# Patient Record
Sex: FEMALE | Race: WHITE | NOT HISPANIC OR LATINO | Employment: STUDENT | URBAN - METROPOLITAN AREA
[De-identification: names, ages, dates, MRNs, and addresses within clinical notes are randomized per-mention and may not be internally consistent; named-entity substitution may affect disease eponyms.]

---
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
## 2019-02-11 ENCOUNTER — TELEPHONE (OUTPATIENT)
Dept: ORTHOPEDICS | Facility: CLINIC | Age: 19
End: 2019-02-11

## 2019-02-11 NOTE — TELEPHONE ENCOUNTER
Scheduled patient with Dr. Asif for scoli check up on 3/12/19 @ 3PM. Patients mother verbalized understanding.

## 2019-03-08 ENCOUNTER — TELEPHONE (OUTPATIENT)
Dept: ORTHOPEDICS | Facility: CLINIC | Age: 19
End: 2019-03-08

## 2019-03-08 NOTE — TELEPHONE ENCOUNTER
Called and spoke with patient mom about rescheduling patient appointment 3/12 because dr villanueva will not be in the office patient mom stated that patient will call the office back to reschedule that appointment when she look at her schedule gave mom office number

## 2019-04-30 ENCOUNTER — HOSPITAL ENCOUNTER (OUTPATIENT)
Dept: RADIOLOGY | Facility: HOSPITAL | Age: 19
Discharge: HOME | End: 2019-04-30
Attending: ORTHOPAEDIC SURGERY
Payer: COMMERCIAL

## 2019-04-30 ENCOUNTER — OFFICE VISIT (OUTPATIENT)
Dept: ORTHOPEDICS | Facility: CLINIC | Age: 19
End: 2019-04-30
Payer: COMMERCIAL

## 2019-04-30 VITALS — BODY MASS INDEX: 19.79 KG/M2 | HEIGHT: 64 IN | WEIGHT: 115.94 LBS

## 2019-04-30 DIAGNOSIS — M41.124 ADOLESCENT IDIOPATHIC SCOLIOSIS OF THORACIC REGION: Primary | ICD-10-CM

## 2019-04-30 DIAGNOSIS — M41.124 ADOLESCENT IDIOPATHIC SCOLIOSIS OF THORACIC REGION: ICD-10-CM

## 2019-04-30 PROCEDURE — 72081 XR SPINE SCOLIOSIS 1 VIEW_SUPINE OR ERECT: ICD-10-PCS | Mod: 26,,, | Performed by: RADIOLOGY

## 2019-04-30 PROCEDURE — 99214 PR OFFICE/OUTPT VISIT, EST, LEVL IV, 30-39 MIN: ICD-10-PCS | Mod: S$GLB,,, | Performed by: ORTHOPAEDIC SURGERY

## 2019-04-30 PROCEDURE — 99999 PR PBB SHADOW E&M-EST. PATIENT-LVL II: ICD-10-PCS | Mod: PBBFAC,,, | Performed by: ORTHOPAEDIC SURGERY

## 2019-04-30 PROCEDURE — 99999 PR PBB SHADOW E&M-EST. PATIENT-LVL II: CPT | Mod: PBBFAC,,, | Performed by: ORTHOPAEDIC SURGERY

## 2019-04-30 PROCEDURE — 72081 X-RAY EXAM ENTIRE SPI 1 VW: CPT | Mod: TC

## 2019-04-30 PROCEDURE — 72081 X-RAY EXAM ENTIRE SPI 1 VW: CPT | Mod: 26,,, | Performed by: RADIOLOGY

## 2019-04-30 PROCEDURE — 99214 OFFICE O/P EST MOD 30 MIN: CPT | Mod: S$GLB,,, | Performed by: ORTHOPAEDIC SURGERY

## 2019-04-30 NOTE — PROGRESS NOTES
Columba is here for a consult for scoliosis.  This was noticed 8 years ago by  .  The curve is mainly thoracic.  It has been worsening. Treatment has included none.  She rates pain a 0 Pain is occassional Menarche was 4 years. Graduated and in college  Family History reviewed and significant for in a sister treated in a brace      (Not in a hospital admission)    Review of Symptoms: Review of Symptoms:Review of Systems   Constitution: Negative for fever and weight loss.   HENT: Negative for congestion.    Eyes: Negative.  Negative for blurred vision.   Cardiovascular: Negative for chest pain.   Respiratory: Negative for cough.    Skin: Negative for rash.   Musculoskeletal: Negative for joint pain.   Gastrointestinal: Negative for abdominal pain.   Genitourinary: Negative for bladder incontinence.   Neurological: Negative for focal weakness.     Active Ambulatory Problems     Diagnosis Date Noted    Ganglion cyst 03/11/2014    Idiopathic scoliosis 03/30/2015    KRISTY (generalized anxiety disorder) 12/11/2018     Resolved Ambulatory Problems     Diagnosis Date Noted    No Resolved Ambulatory Problems     Past Medical History:   Diagnosis Date    KRISTY (generalized anxiety disorder) 12/11/2018    Scoliosis        Physical Exam    Patient alert and oriented  No obvious deformities of face, head or neck.    All extremities pink and warm with good cap refill and no edema.   No skin lesions face back or extremities   Bilateral hips, knees and ankles full and normal ROM  Gait normal.  Motor exam upper and lower extremities intact  Back shows full rom.  Rotation and deformity severe rightthoracic and moderate leftthoracic    Xrays  Xrays were done today  and by my reading,   and show a right mid thoracic curve of 57 degrees T7-L1, a left lumbar curve of 34 degrees and a left upper thoracic curve of 26 Degrees.      Impresion   Scoliosis progressive    Plan  She has definitely progressed despite skeletal maturity.  Discussed surgery at length.  Plan for Posterior spine fusion. Greater then 30 minutes spent with patient, over half that time was spent discussing the above issues.

## 2019-05-03 DIAGNOSIS — M41.124 ADOLESCENT IDIOPATHIC SCOLIOSIS, THORACIC REGION: Primary | ICD-10-CM

## 2019-06-10 RX ORDER — ESCITALOPRAM OXALATE 10 MG/1
TABLET ORAL
Qty: 90 TABLET | Refills: 1 | Status: SHIPPED | OUTPATIENT
Start: 2019-06-10 | End: 2019-12-11 | Stop reason: SDUPTHER

## 2019-07-30 ENCOUNTER — RESEARCH ENCOUNTER (OUTPATIENT)
Dept: RESEARCH | Facility: HOSPITAL | Age: 19
End: 2019-07-30

## 2019-07-30 ENCOUNTER — HOSPITAL ENCOUNTER (OUTPATIENT)
Dept: RADIOLOGY | Facility: HOSPITAL | Age: 19
Discharge: HOME | End: 2019-07-30
Attending: NURSE PRACTITIONER
Payer: COMMERCIAL

## 2019-07-30 ENCOUNTER — OFFICE VISIT (OUTPATIENT)
Dept: ORTHOPEDICS | Facility: CLINIC | Age: 19
End: 2019-07-30
Payer: COMMERCIAL

## 2019-07-30 VITALS
HEART RATE: 84 BPM | HEIGHT: 64 IN | TEMPERATURE: 99 F | SYSTOLIC BLOOD PRESSURE: 111 MMHG | DIASTOLIC BLOOD PRESSURE: 68 MMHG | WEIGHT: 115.94 LBS | BODY MASS INDEX: 19.79 KG/M2

## 2019-07-30 DIAGNOSIS — M41.124 ADOLESCENT IDIOPATHIC SCOLIOSIS, THORACIC REGION: Primary | ICD-10-CM

## 2019-07-30 DIAGNOSIS — M41.124 ADOLESCENT IDIOPATHIC SCOLIOSIS, THORACIC REGION: ICD-10-CM

## 2019-07-30 DIAGNOSIS — L70.9 ACNE, UNSPECIFIED ACNE TYPE: ICD-10-CM

## 2019-07-30 PROCEDURE — 72040 XR SPINE 1 VIEW ANY LEVEL: ICD-10-PCS | Mod: 26,,, | Performed by: RADIOLOGY

## 2019-07-30 PROCEDURE — 99999 PR PBB SHADOW E&M-EST. PATIENT-LVL III: ICD-10-PCS | Mod: PBBFAC,,, | Performed by: NURSE PRACTITIONER

## 2019-07-30 PROCEDURE — 72040 X-RAY EXAM NECK SPINE 2-3 VW: CPT | Mod: 26,,, | Performed by: RADIOLOGY

## 2019-07-30 PROCEDURE — 99499 UNLISTED E&M SERVICE: CPT | Mod: S$GLB,,, | Performed by: NURSE PRACTITIONER

## 2019-07-30 PROCEDURE — 99999 PR PBB SHADOW E&M-EST. PATIENT-LVL III: CPT | Mod: PBBFAC,,, | Performed by: NURSE PRACTITIONER

## 2019-07-30 PROCEDURE — 99499 NO LOS: ICD-10-PCS | Mod: S$GLB,,, | Performed by: NURSE PRACTITIONER

## 2019-07-30 PROCEDURE — 72020 X-RAY EXAM OF SPINE 1 VIEW: CPT | Mod: TC

## 2019-07-30 RX ORDER — DOXYCYCLINE 100 MG/1
100 CAPSULE ORAL 2 TIMES DAILY
Qty: 20 CAPSULE | Refills: 0 | Status: SHIPPED | OUTPATIENT
Start: 2019-07-30 | End: 2019-08-09

## 2019-07-30 NOTE — PROGRESS NOTES
Date: 30 July 2019  Time: 1614  Subject Initials: JUAN   IRB#: [id number]     Study: Scoliosis & Postoperative Hypotension Events     PI: MD Linda Claire NP consented this patient today. The following was discussed:     · Met with participant to discuss possible participation in a research study  · Participant was given a copy of the Informed Consent Form for review  · Participant read the Informed Consent Form in full   · All risks, benefits, alternative therapies, confidentiality, and study requirements were discussed  · Privacy issues and withdrawal options, including HIPAA, were discussed  · Ample opportunity was provided for participant to ask questions and to consider participation  · Participant verbalized that all questions were satisfactorily answered and that they understood the protocol and its requirements  · Participant was provided with contact information for the investigator, physician & research coordinator for future questions or concerns  · Participant denied involvement in any other research study  · Informed consent was signed by participant; she was provided with a signed consent form for her records.      Consent was obtained prior to conducting any study-related procedures.

## 2019-08-09 ENCOUNTER — TELEPHONE (OUTPATIENT)
Dept: ORTHOPEDICS | Facility: CLINIC | Age: 19
End: 2019-08-09

## 2019-08-09 DIAGNOSIS — L70.9 ACNE, UNSPECIFIED ACNE TYPE: Primary | ICD-10-CM

## 2019-08-09 RX ORDER — DOXYCYCLINE 100 MG/1
100 CAPSULE ORAL 2 TIMES DAILY
Qty: 14 CAPSULE | Refills: 0 | Status: SHIPPED | OUTPATIENT
Start: 2019-08-09 | End: 2019-08-16

## 2019-08-09 NOTE — TELEPHONE ENCOUNTER
----- Message from Maria Isabel López LPN sent at 8/9/2019  4:13 PM CDT -----  Regarding: FW:Reminder for Upcoming Procedure  Contact: [phone]      ----- Message -----  From: Columba Allison  Sent: 8/9/2019   1:00 PM  To: Yefri Sharpe  Subject: RE:Reminder for Upcoming Procedure               Hi I misplaced my acne medication, is there a way to send another perscription? Thanks  ----- Message -----  From: Alexander Asif MD  Sent: 8/9/2019  8:30 AM CDT  To: Columba Allison  Subject: Reminder for Upcoming Procedure  [facility] [address] [place] [address]    08/09/2019      Dear Columba,      This is a reminder for your upcoming procedure with Alexander Asif MD on 8/16/2019. We will contact you again before the day of your procedure with your scheduled arrival time unless you have already received this information from your physicians office.         If you have questions or scheduling concerns, you can contact your physicians office:   Alexander Asif MD  Phone Number: [phone]      Sincerely,     [facility] [address] [place] [address]

## 2019-08-12 NOTE — PROGRESS NOTES
sSubjective:      Patient ID: Columba Allison is a 18 y.o. female.    Chief Complaint: Scoliosis    Patient is here today for preop for PSF with Dr. Asif. Hx of scoliosis. No other complaints, denies pain.       Review of patient's allergies indicates:  No Known Allergies    Past Medical History:   Diagnosis Date    KRISTY (generalized anxiety disorder) 12/11/2018    Scoliosis      No past surgical history on file.  Family History   Problem Relation Age of Onset    Miscarriages / Stillbirths Mother         1    Irritable bowel syndrome Mother     No Known Problems Father     Alcohol abuse Neg Hx     Arthritis Neg Hx     Asthma Neg Hx     Birth defects Neg Hx     Cancer Neg Hx     COPD Neg Hx     Depression Neg Hx     Diabetes Neg Hx     Drug abuse Neg Hx     Early death Neg Hx     Hearing loss Neg Hx     Heart disease Neg Hx     Hyperlipidemia Neg Hx     Hypertension Neg Hx     Kidney disease Neg Hx     Learning disabilities Neg Hx     Mental illness Neg Hx     Mental retardation Neg Hx     Stroke Neg Hx     Vision loss Neg Hx     Inflammatory bowel disease Neg Hx        Current Outpatient Medications on File Prior to Visit   Medication Sig Dispense Refill    ALPRAZolam (XANAX) 0.25 MG tablet 1 tab po daily only as needed for severe anxiety 30 tablet 0    escitalopram oxalate (LEXAPRO) 10 MG tablet TAKE 1 TABLET BY MOUTH EVERY DAY 90 tablet 1    MARLISSA 0.15-0.03 mg per tablet        No current facility-administered medications on file prior to visit.        Social History     Social History Narrative    Lives with mom, sister and stepfather; dad involved, lives in town; one dog       Review of Systems   Constitution: Negative for chills, fever and malaise/fatigue.   Cardiovascular: Negative for chest pain and dyspnea on exertion.   Respiratory: Negative for cough and shortness of breath.    Skin: Negative for color change, dry skin, itching, nail changes, rash and suspicious lesions.    Musculoskeletal: Negative for joint pain and joint swelling.   Neurological: Negative for dizziness, numbness, paresthesias and weakness.         Objective:         Afebrile, Vital signs stable   Gen - well-developed, well-nourished, no acute distress  HEENT - Pupils equal/round/reactive to light, normocephalic, atraumatic   Neuro - Normal reflexes, normal sensation, normal motor exam  CV - Regular rate and rhythm, palpable distal pulses   Pulm - Good inspiratory effort with unlaboured breathing  Abd - +Bowel sounds, non-tender, non-distended    General    Development well-developed   Nutrition well-nourished   Body Habitus normal weight   Mood distressed    Speech normal    Tone normal        Spine    Gait Normal    Alignment normal  and scoliosis   Tenderness no tenderness   Sensation normal   Tone tone   Skin Normal skin        Extension normal    Flexion normal    Lateral Bend Right normal  Left normal    Rotation Right normal   Left normal      Functional Tests   Right abnormal straight leg raise test    Left abnormal straight leg raise test     Muscle Strength  Hip Flexors Right 4/5 Left 4/5   Quadriceps Right 4/5 Left 4/5   Hamstrings Right 4/5 Left 4/5   Anterior Tibial Right 4/5 Left 4/5   Gastrocsoleus Right 4/5 Left 4/5   EHL Right 4/5 Left 4/5     Reflexes  Patella reflex Right 2+ Left 2+   Achilles reflex Right 2+ Left 2+     Vascular Exam  Posterior Tibial pulse Right 2+ Left 2+   Dorsalis Pectus pulse Right 2+ Left 2+         Lower              Extremity  Pulse Right 2+  Left 2+  Right 2+  Left 2+             xrays by my read shows left L1-L4 42, T8-T12 right 55, T40T8 left 27, Risser 5, scoliometer shows 15 degrees right thoracic rotation; bending films shows correction to lumbar and mid thoracic spine       Assessment:       1. Adolescent idiopathic scoliosis, thoracic region    2. Acne, unspecified acne type           Plan:       Plan is for thoracic thoracic PSF. Surgical benefits and risks  discussed. Doxycycline as directed for acne. Patient to stop OCP. Consents signed. Pre-op teaching done, labs pending. All questions answered.   Follow up in about 2 weeks (around 8/13/2019).

## 2019-08-14 ENCOUNTER — ANESTHESIA EVENT (OUTPATIENT)
Dept: SURGERY | Facility: HOSPITAL | Age: 19
DRG: 458 | End: 2019-08-14
Payer: COMMERCIAL

## 2019-08-15 ENCOUNTER — HOSPITAL ENCOUNTER (INPATIENT)
Facility: HOSPITAL | Age: 19
LOS: 3 days | Discharge: HOME | DRG: 458 | End: 2019-08-18
Attending: ORTHOPAEDIC SURGERY | Admitting: ORTHOPAEDIC SURGERY
Payer: COMMERCIAL

## 2019-08-15 ENCOUNTER — ANESTHESIA (OUTPATIENT)
Dept: SURGERY | Facility: HOSPITAL | Age: 19
DRG: 458 | End: 2019-08-15
Payer: COMMERCIAL

## 2019-08-15 DIAGNOSIS — M41.124 ADOLESCENT IDIOPATHIC SCOLIOSIS OF THORACIC REGION: Primary | ICD-10-CM

## 2019-08-15 DIAGNOSIS — M41.9 SCOLIOSIS: ICD-10-CM

## 2019-08-15 LAB
ABO + RH BLD: NORMAL
BLD GP AB SCN CELLS X3 SERPL QL: NORMAL
GLUCOSE SERPL-MCNC: 78 MG/DL (ref 70–110)
GLUCOSE SERPL-MCNC: 80 MG/DL (ref 70–110)
GLUCOSE SERPL-MCNC: 93 MG/DL (ref 70–110)
HCO3 UR-SCNC: 20.5 MMOL/L (ref 24–28)
HCO3 UR-SCNC: 22.9 MMOL/L (ref 24–28)
HCO3 UR-SCNC: 25 MMOL/L (ref 24–28)
HCT VFR BLD CALC: 23 %PCV (ref 36–54)
HCT VFR BLD CALC: 26 %PCV (ref 36–54)
HCT VFR BLD CALC: 32 %PCV (ref 36–54)
PCO2 BLDA: 33.3 MMHG (ref 35–45)
PCO2 BLDA: 38.2 MMHG (ref 35–45)
PCO2 BLDA: 57.7 MMHG (ref 35–45)
PH SMN: 7.24 [PH] (ref 7.35–7.45)
PH SMN: 7.39 [PH] (ref 7.35–7.45)
PH SMN: 7.4 [PH] (ref 7.35–7.45)
PO2 BLDA: 253 MMHG (ref 80–100)
PO2 BLDA: 271 MMHG (ref 80–100)
PO2 BLDA: 541 MMHG (ref 80–100)
POC BE: -2 MMOL/L
POC BE: -2 MMOL/L
POC BE: -4 MMOL/L
POC IONIZED CALCIUM: 0.92 MMOL/L (ref 1.06–1.42)
POC IONIZED CALCIUM: 1.02 MMOL/L (ref 1.06–1.42)
POC IONIZED CALCIUM: 1.09 MMOL/L (ref 1.06–1.42)
POC SATURATED O2: 100 % (ref 95–100)
POC TCO2: 21 MMOL/L (ref 23–27)
POC TCO2: 24 MMOL/L (ref 23–27)
POC TCO2: 27 MMOL/L (ref 23–27)
POTASSIUM BLD-SCNC: 3.3 MMOL/L (ref 3.5–5.1)
POTASSIUM BLD-SCNC: 3.4 MMOL/L (ref 3.5–5.1)
POTASSIUM BLD-SCNC: 3.7 MMOL/L (ref 3.5–5.1)
SAMPLE: ABNORMAL
SODIUM BLD-SCNC: 142 MMOL/L (ref 136–145)
SODIUM BLD-SCNC: 148 MMOL/L (ref 136–145)
SODIUM BLD-SCNC: 148 MMOL/L (ref 136–145)

## 2019-08-15 PROCEDURE — 63600175 PHARM REV CODE 636 W HCPCS: Performed by: STUDENT IN AN ORGANIZED HEALTH CARE EDUCATION/TRAINING PROGRAM

## 2019-08-15 PROCEDURE — P9045 ALBUMIN (HUMAN), 5%, 250 ML: HCPCS | Mod: JG | Performed by: STUDENT IN AN ORGANIZED HEALTH CARE EDUCATION/TRAINING PROGRAM

## 2019-08-15 PROCEDURE — 36620 INSERTION CATHETER ARTERY: CPT | Mod: 59,,, | Performed by: ANESTHESIOLOGY

## 2019-08-15 PROCEDURE — 25000003 PHARM REV CODE 250: Performed by: NURSE ANESTHETIST, CERTIFIED REGISTERED

## 2019-08-15 PROCEDURE — 63600175 PHARM REV CODE 636 W HCPCS: Performed by: ANESTHESIOLOGY

## 2019-08-15 PROCEDURE — 25000003 PHARM REV CODE 250: Performed by: ORTHOPAEDIC SURGERY

## 2019-08-15 PROCEDURE — 36000711: Performed by: ORTHOPAEDIC SURGERY

## 2019-08-15 PROCEDURE — S0077 INJECTION, CLINDAMYCIN PHOSP: HCPCS | Performed by: NURSE PRACTITIONER

## 2019-08-15 PROCEDURE — D9220A PRA ANESTHESIA: Mod: ANES,,, | Performed by: ANESTHESIOLOGY

## 2019-08-15 PROCEDURE — 63600175 PHARM REV CODE 636 W HCPCS

## 2019-08-15 PROCEDURE — 63600175 PHARM REV CODE 636 W HCPCS: Performed by: NURSE PRACTITIONER

## 2019-08-15 PROCEDURE — 86920 COMPATIBILITY TEST SPIN: CPT

## 2019-08-15 PROCEDURE — 27100088 HC CELL SAVER

## 2019-08-15 PROCEDURE — 25000003 PHARM REV CODE 250: Performed by: NURSE PRACTITIONER

## 2019-08-15 PROCEDURE — 27201423 OPTIME MED/SURG SUP & DEVICES STERILE SUPPLY: Performed by: ORTHOPAEDIC SURGERY

## 2019-08-15 PROCEDURE — 25000003 PHARM REV CODE 250: Performed by: STUDENT IN AN ORGANIZED HEALTH CARE EDUCATION/TRAINING PROGRAM

## 2019-08-15 PROCEDURE — D9220A PRA ANESTHESIA: ICD-10-PCS | Mod: ANES,,, | Performed by: ANESTHESIOLOGY

## 2019-08-15 PROCEDURE — 36000710: Performed by: ORTHOPAEDIC SURGERY

## 2019-08-15 PROCEDURE — 63600175 PHARM REV CODE 636 W HCPCS: Mod: JG | Performed by: STUDENT IN AN ORGANIZED HEALTH CARE EDUCATION/TRAINING PROGRAM

## 2019-08-15 PROCEDURE — 86850 RBC ANTIBODY SCREEN: CPT

## 2019-08-15 PROCEDURE — 99291 CRITICAL CARE FIRST HOUR: CPT | Mod: ,,, | Performed by: PEDIATRICS

## 2019-08-15 PROCEDURE — 27800903 OPTIME MED/SURG SUP & DEVICES OTHER IMPLANTS: Performed by: ORTHOPAEDIC SURGERY

## 2019-08-15 PROCEDURE — 25000003 PHARM REV CODE 250: Performed by: ANESTHESIOLOGY

## 2019-08-15 PROCEDURE — 99291 PR CRITICAL CARE, E/M 30-74 MINUTES: ICD-10-PCS | Mod: ,,, | Performed by: PEDIATRICS

## 2019-08-15 PROCEDURE — 36620 PR INSERT CATH,ART,PERCUT,SHORTTERM: ICD-10-PCS | Mod: 59,,, | Performed by: ANESTHESIOLOGY

## 2019-08-15 PROCEDURE — 63600175 PHARM REV CODE 636 W HCPCS: Performed by: NURSE ANESTHETIST, CERTIFIED REGISTERED

## 2019-08-15 PROCEDURE — 37000009 HC ANESTHESIA EA ADD 15 MINS: Performed by: ORTHOPAEDIC SURGERY

## 2019-08-15 PROCEDURE — 37000008 HC ANESTHESIA 1ST 15 MINUTES: Performed by: ORTHOPAEDIC SURGERY

## 2019-08-15 PROCEDURE — D9220A PRA ANESTHESIA: ICD-10-PCS | Mod: CRNA,,, | Performed by: NURSE ANESTHETIST, CERTIFIED REGISTERED

## 2019-08-15 PROCEDURE — 20300000 HC PICU ROOM

## 2019-08-15 PROCEDURE — D9220A PRA ANESTHESIA: Mod: CRNA,,, | Performed by: NURSE ANESTHETIST, CERTIFIED REGISTERED

## 2019-08-15 PROCEDURE — 63600175 PHARM REV CODE 636 W HCPCS: Performed by: ORTHOPAEDIC SURGERY

## 2019-08-15 PROCEDURE — C1729 CATH, DRAINAGE: HCPCS | Performed by: ORTHOPAEDIC SURGERY

## 2019-08-15 PROCEDURE — C1713 ANCHOR/SCREW BN/BN,TIS/BN: HCPCS | Performed by: ORTHOPAEDIC SURGERY

## 2019-08-15 DEVICE — SCREW UNIAXIAL 5.0X30MM: Type: IMPLANTABLE DEVICE | Site: BACK | Status: FUNCTIONAL

## 2019-08-15 DEVICE — SCREW UNIAXIAL 5.0X35MM: Type: IMPLANTABLE DEVICE | Site: BACK | Status: FUNCTIONAL

## 2019-08-15 DEVICE — BONE 30CC CANCELLOUS CRUSHED: Type: IMPLANTABLE DEVICE | Site: BACK | Status: FUNCTIONAL

## 2019-08-15 DEVICE — SCREW POLYAXIAL 5.0X35MM: Type: IMPLANTABLE DEVICE | Site: BACK | Status: FUNCTIONAL

## 2019-08-15 DEVICE — SET SCREW LARGE: Type: IMPLANTABLE DEVICE | Site: BACK | Status: FUNCTIONAL

## 2019-08-15 DEVICE — IMPLANTABLE DEVICE: Type: IMPLANTABLE DEVICE | Site: BACK | Status: FUNCTIONAL

## 2019-08-15 RX ORDER — NICARDIPINE HYDROCHLORIDE 0.2 MG/ML
INJECTION INTRAVENOUS
Status: DISCONTINUED | OUTPATIENT
Start: 2019-08-15 | End: 2019-08-15

## 2019-08-15 RX ORDER — MORPHINE SULFATE 2 MG/ML
2 INJECTION, SOLUTION INTRAMUSCULAR; INTRAVENOUS ONCE
Status: DISCONTINUED | OUTPATIENT
Start: 2019-08-15 | End: 2019-08-16

## 2019-08-15 RX ORDER — PROPOFOL 10 MG/ML
VIAL (ML) INTRAVENOUS CONTINUOUS PRN
Status: DISCONTINUED | OUTPATIENT
Start: 2019-08-15 | End: 2019-08-15

## 2019-08-15 RX ORDER — DIAZEPAM 10 MG/2ML
1 INJECTION INTRAMUSCULAR ONCE
Status: DISCONTINUED | OUTPATIENT
Start: 2019-08-15 | End: 2019-08-18 | Stop reason: HOSPADM

## 2019-08-15 RX ORDER — PHENYLEPHRINE HYDROCHLORIDE 10 MG/ML
INJECTION INTRAVENOUS
Status: DISCONTINUED | OUTPATIENT
Start: 2019-08-15 | End: 2019-08-15

## 2019-08-15 RX ORDER — GLYCOPYRROLATE 0.2 MG/ML
INJECTION INTRAMUSCULAR; INTRAVENOUS
Status: DISCONTINUED | OUTPATIENT
Start: 2019-08-15 | End: 2019-08-15

## 2019-08-15 RX ORDER — ALBUMIN HUMAN 50 G/1000ML
SOLUTION INTRAVENOUS CONTINUOUS PRN
Status: DISCONTINUED | OUTPATIENT
Start: 2019-08-15 | End: 2019-08-15

## 2019-08-15 RX ORDER — POTASSIUM CHLORIDE 14.9 MG/ML
INJECTION INTRAVENOUS CONTINUOUS PRN
Status: DISCONTINUED | OUTPATIENT
Start: 2019-08-15 | End: 2019-08-15

## 2019-08-15 RX ORDER — DEXTROSE MONOHYDRATE, SODIUM CHLORIDE, AND POTASSIUM CHLORIDE 50; 1.49; 9 G/1000ML; G/1000ML; G/1000ML
INJECTION, SOLUTION INTRAVENOUS CONTINUOUS
Status: DISCONTINUED | OUTPATIENT
Start: 2019-08-15 | End: 2019-08-16

## 2019-08-15 RX ORDER — DIAZEPAM 5 MG/5ML
3 SOLUTION ORAL EVERY 6 HOURS PRN
Status: DISCONTINUED | OUTPATIENT
Start: 2019-08-15 | End: 2019-08-16

## 2019-08-15 RX ORDER — HYDROMORPHONE HYDROCHLORIDE 1 MG/ML
INJECTION, SOLUTION INTRAMUSCULAR; INTRAVENOUS; SUBCUTANEOUS
Status: DISPENSED
Start: 2019-08-15 | End: 2019-08-16

## 2019-08-15 RX ORDER — DIAZEPAM 5 MG/5ML
1 SOLUTION ORAL ONCE
Status: DISCONTINUED | OUTPATIENT
Start: 2019-08-15 | End: 2019-08-18 | Stop reason: HOSPADM

## 2019-08-15 RX ORDER — DIAZEPAM 10 MG/2ML
INJECTION INTRAMUSCULAR
Status: COMPLETED
Start: 2019-08-15 | End: 2019-08-15

## 2019-08-15 RX ORDER — MIDAZOLAM HYDROCHLORIDE 1 MG/ML
INJECTION, SOLUTION INTRAMUSCULAR; INTRAVENOUS
Status: DISCONTINUED | OUTPATIENT
Start: 2019-08-15 | End: 2019-08-15

## 2019-08-15 RX ORDER — NICARDIPINE HYDROCHLORIDE 0.2 MG/ML
INJECTION INTRAVENOUS CONTINUOUS PRN
Status: DISCONTINUED | OUTPATIENT
Start: 2019-08-15 | End: 2019-08-15

## 2019-08-15 RX ORDER — KETAMINE HCL IN 0.9 % NACL 50 MG/5 ML
SYRINGE (ML) INTRAVENOUS
Status: DISCONTINUED | OUTPATIENT
Start: 2019-08-15 | End: 2019-08-15

## 2019-08-15 RX ORDER — BACITRACIN 50000 [IU]/1
INJECTION, POWDER, FOR SOLUTION INTRAMUSCULAR
Status: DISCONTINUED | OUTPATIENT
Start: 2019-08-15 | End: 2019-08-15 | Stop reason: HOSPADM

## 2019-08-15 RX ORDER — KETOROLAC TROMETHAMINE 10 MG/1
10 TABLET, FILM COATED ORAL EVERY 8 HOURS PRN
Status: DISCONTINUED | OUTPATIENT
Start: 2019-08-15 | End: 2019-08-17

## 2019-08-15 RX ORDER — ADHESIVE BANDAGE
30 BANDAGE TOPICAL 2 TIMES DAILY
Status: DISCONTINUED | OUTPATIENT
Start: 2019-08-15 | End: 2019-08-18 | Stop reason: HOSPADM

## 2019-08-15 RX ORDER — PROPOFOL 10 MG/ML
VIAL (ML) INTRAVENOUS
Status: DISCONTINUED | OUTPATIENT
Start: 2019-08-15 | End: 2019-08-15

## 2019-08-15 RX ORDER — ROCURONIUM BROMIDE 10 MG/ML
INJECTION, SOLUTION INTRAVENOUS
Status: DISCONTINUED | OUTPATIENT
Start: 2019-08-15 | End: 2019-08-15

## 2019-08-15 RX ORDER — METHOCARBAMOL 750 MG/1
750 TABLET, FILM COATED ORAL 4 TIMES DAILY
Status: DISCONTINUED | OUTPATIENT
Start: 2019-08-15 | End: 2019-08-17

## 2019-08-15 RX ORDER — ONDANSETRON 2 MG/ML
INJECTION INTRAMUSCULAR; INTRAVENOUS
Status: DISCONTINUED | OUTPATIENT
Start: 2019-08-15 | End: 2019-08-15

## 2019-08-15 RX ORDER — EPHEDRINE SULFATE 50 MG/ML
INJECTION, SOLUTION INTRAVENOUS
Status: DISCONTINUED | OUTPATIENT
Start: 2019-08-15 | End: 2019-08-15

## 2019-08-15 RX ORDER — HYDROMORPHONE HYDROCHLORIDE 2 MG/ML
INJECTION, SOLUTION INTRAMUSCULAR; INTRAVENOUS; SUBCUTANEOUS
Status: DISCONTINUED | OUTPATIENT
Start: 2019-08-15 | End: 2019-08-15

## 2019-08-15 RX ORDER — MORPHINE SULFATE 1 MG/ML
INJECTION INTRAVENOUS CONTINUOUS
Status: DISCONTINUED | OUTPATIENT
Start: 2019-08-15 | End: 2019-08-16

## 2019-08-15 RX ORDER — TRANEXAMIC ACID 100 MG/ML
INJECTION, SOLUTION INTRAVENOUS CONTINUOUS PRN
Status: DISCONTINUED | OUTPATIENT
Start: 2019-08-15 | End: 2019-08-15

## 2019-08-15 RX ORDER — ESCITALOPRAM OXALATE 5 MG/1
10 TABLET ORAL DAILY
Status: DISCONTINUED | OUTPATIENT
Start: 2019-08-16 | End: 2019-08-16

## 2019-08-15 RX ORDER — FAMOTIDINE 10 MG/ML
20 INJECTION INTRAVENOUS 2 TIMES DAILY
Status: DISCONTINUED | OUTPATIENT
Start: 2019-08-15 | End: 2019-08-17

## 2019-08-15 RX ORDER — ACETAMINOPHEN 650 MG/20.3ML
325 LIQUID ORAL EVERY 6 HOURS
Status: DISCONTINUED | OUTPATIENT
Start: 2019-08-15 | End: 2019-08-15

## 2019-08-15 RX ORDER — ACETAMINOPHEN 160 MG/5ML
325 SOLUTION ORAL EVERY 6 HOURS
Status: DISCONTINUED | OUTPATIENT
Start: 2019-08-15 | End: 2019-08-16

## 2019-08-15 RX ORDER — NALOXONE HCL 0.4 MG/ML
0.02 VIAL (ML) INJECTION
Status: DISCONTINUED | OUTPATIENT
Start: 2019-08-15 | End: 2019-08-18 | Stop reason: HOSPADM

## 2019-08-15 RX ORDER — SODIUM CHLORIDE 9 MG/ML
INJECTION, SOLUTION INTRAVENOUS CONTINUOUS PRN
Status: DISCONTINUED | OUTPATIENT
Start: 2019-08-15 | End: 2019-08-15

## 2019-08-15 RX ORDER — GABAPENTIN 100 MG/1
400 CAPSULE ORAL 3 TIMES DAILY
Status: DISCONTINUED | OUTPATIENT
Start: 2019-08-15 | End: 2019-08-16

## 2019-08-15 RX ORDER — FENTANYL CITRATE 50 UG/ML
INJECTION, SOLUTION INTRAMUSCULAR; INTRAVENOUS
Status: DISCONTINUED | OUTPATIENT
Start: 2019-08-15 | End: 2019-08-15

## 2019-08-15 RX ORDER — CLINDAMYCIN PHOSPHATE 600 MG/50ML
600 INJECTION, SOLUTION INTRAVENOUS
Status: COMPLETED | OUTPATIENT
Start: 2019-08-15 | End: 2019-08-15

## 2019-08-15 RX ORDER — MORPHINE SULFATE 2 MG/ML
INJECTION, SOLUTION INTRAMUSCULAR; INTRAVENOUS
Status: COMPLETED
Start: 2019-08-15 | End: 2019-08-15

## 2019-08-15 RX ORDER — TRANEXAMIC ACID 100 MG/ML
INJECTION, SOLUTION INTRAVENOUS
Status: DISCONTINUED | OUTPATIENT
Start: 2019-08-15 | End: 2019-08-15

## 2019-08-15 RX ORDER — CLINDAMYCIN PHOSPHATE 600 MG/50ML
600 INJECTION, SOLUTION INTRAVENOUS
Status: DISCONTINUED | OUTPATIENT
Start: 2019-08-15 | End: 2019-08-17

## 2019-08-15 RX ORDER — NEOSTIGMINE METHYLSULFATE 1 MG/ML
INJECTION, SOLUTION INTRAVENOUS
Status: DISCONTINUED | OUTPATIENT
Start: 2019-08-15 | End: 2019-08-15

## 2019-08-15 RX ORDER — ACETAMINOPHEN 10 MG/ML
INJECTION, SOLUTION INTRAVENOUS
Status: DISCONTINUED | OUTPATIENT
Start: 2019-08-15 | End: 2019-08-15

## 2019-08-15 RX ADMIN — NICARDIPINE HYDROCHLORIDE 0.5 MCG/KG/MIN: 0.2 INJECTION, SOLUTION INTRAVENOUS at 02:08

## 2019-08-15 RX ADMIN — PROPOFOL 50 MG: 10 INJECTION, EMULSION INTRAVENOUS at 12:08

## 2019-08-15 RX ADMIN — PHENYLEPHRINE HYDROCHLORIDE 10 MCG: 10 INJECTION INTRAVENOUS at 03:08

## 2019-08-15 RX ADMIN — CALCIUM CHLORIDE 200 MG: 100 INJECTION, SOLUTION INTRAVENOUS at 04:08

## 2019-08-15 RX ADMIN — HYDROMORPHONE HYDROCHLORIDE 0.25 MG: 2 INJECTION, SOLUTION INTRAMUSCULAR; INTRAVENOUS; SUBCUTANEOUS at 07:08

## 2019-08-15 RX ADMIN — CLINDAMYCIN IN 5 PERCENT DEXTROSE 600 MG: 12 INJECTION, SOLUTION INTRAVENOUS at 01:08

## 2019-08-15 RX ADMIN — EPHEDRINE SULFATE 10 MG: 50 INJECTION, SOLUTION INTRAMUSCULAR; INTRAVENOUS; SUBCUTANEOUS at 01:08

## 2019-08-15 RX ADMIN — SODIUM CHLORIDE, SODIUM GLUCONATE, SODIUM ACETATE, POTASSIUM CHLORIDE, MAGNESIUM CHLORIDE, SODIUM PHOSPHATE, DIBASIC, AND POTASSIUM PHOSPHATE: .53; .5; .37; .037; .03; .012; .00082 INJECTION, SOLUTION INTRAVENOUS at 03:08

## 2019-08-15 RX ADMIN — REMIFENTANIL HYDROCHLORIDE 0.1 MCG/KG/MIN: 1 INJECTION, POWDER, LYOPHILIZED, FOR SOLUTION INTRAVENOUS at 01:08

## 2019-08-15 RX ADMIN — PHENYLEPHRINE HYDROCHLORIDE 25 MCG: 10 INJECTION INTRAVENOUS at 03:08

## 2019-08-15 RX ADMIN — FENTANYL CITRATE 100 MCG: 50 INJECTION, SOLUTION INTRAMUSCULAR; INTRAVENOUS at 12:08

## 2019-08-15 RX ADMIN — GABAPENTIN 400 MG: 100 CAPSULE ORAL at 09:08

## 2019-08-15 RX ADMIN — PHENYLEPHRINE HYDROCHLORIDE 25 MCG: 10 INJECTION INTRAVENOUS at 05:08

## 2019-08-15 RX ADMIN — PROPOFOL 40 MG: 10 INJECTION, EMULSION INTRAVENOUS at 01:08

## 2019-08-15 RX ADMIN — SODIUM CHLORIDE, SODIUM GLUCONATE, SODIUM ACETATE, POTASSIUM CHLORIDE, MAGNESIUM CHLORIDE, SODIUM PHOSPHATE, DIBASIC, AND POTASSIUM PHOSPHATE: .53; .5; .37; .037; .03; .012; .00082 INJECTION, SOLUTION INTRAVENOUS at 01:08

## 2019-08-15 RX ADMIN — KETAMINE HYDROCHLORIDE 2 MCG/KG/MIN: 50 INJECTION INTRAMUSCULAR; INTRAVENOUS at 01:08

## 2019-08-15 RX ADMIN — DIAZEPAM 1 MG: 5 INJECTION, SOLUTION INTRAMUSCULAR; INTRAVENOUS at 07:08

## 2019-08-15 RX ADMIN — PROPOFOL 100 MCG/KG/MIN: 10 INJECTION, EMULSION INTRAVENOUS at 12:08

## 2019-08-15 RX ADMIN — ROCURONIUM BROMIDE 20 MG: 10 INJECTION, SOLUTION INTRAVENOUS at 01:08

## 2019-08-15 RX ADMIN — PHENYLEPHRINE HYDROCHLORIDE 20 MCG: 10 INJECTION INTRAVENOUS at 05:08

## 2019-08-15 RX ADMIN — PHENYLEPHRINE HYDROCHLORIDE 10 MCG: 10 INJECTION INTRAVENOUS at 04:08

## 2019-08-15 RX ADMIN — ACETAMINOPHEN 500 MG: 10 INJECTION, SOLUTION INTRAVENOUS at 01:08

## 2019-08-15 RX ADMIN — PHENYLEPHRINE HYDROCHLORIDE 20 MCG: 10 INJECTION INTRAVENOUS at 03:08

## 2019-08-15 RX ADMIN — PHENYLEPHRINE HYDROCHLORIDE 20 MCG: 10 INJECTION INTRAVENOUS at 04:08

## 2019-08-15 RX ADMIN — ALBUMIN (HUMAN): 12.5 SOLUTION INTRAVENOUS at 05:08

## 2019-08-15 RX ADMIN — SODIUM CHLORIDE: 0.9 INJECTION, SOLUTION INTRAVENOUS at 12:08

## 2019-08-15 RX ADMIN — FENTANYL CITRATE 25 MCG: 50 INJECTION, SOLUTION INTRAMUSCULAR; INTRAVENOUS at 07:08

## 2019-08-15 RX ADMIN — PHENYLEPHRINE HYDROCHLORIDE 50 MCG: 10 INJECTION INTRAVENOUS at 02:08

## 2019-08-15 RX ADMIN — SODIUM CHLORIDE, SODIUM GLUCONATE, SODIUM ACETATE, POTASSIUM CHLORIDE, MAGNESIUM CHLORIDE, SODIUM PHOSPHATE, DIBASIC, AND POTASSIUM PHOSPHATE: .53; .5; .37; .037; .03; .012; .00082 INJECTION, SOLUTION INTRAVENOUS at 02:08

## 2019-08-15 RX ADMIN — POTASSIUM CHLORIDE: 14.9 INJECTION, SOLUTION INTRAVENOUS at 04:08

## 2019-08-15 RX ADMIN — METHOCARBAMOL TABLETS 750 MG: 750 TABLET, COATED ORAL at 08:08

## 2019-08-15 RX ADMIN — ONDANSETRON 4 MG: 2 INJECTION INTRAMUSCULAR; INTRAVENOUS at 05:08

## 2019-08-15 RX ADMIN — TRANEXAMIC ACID 5 MG/KG/HR: 100 INJECTION, SOLUTION INTRAVENOUS at 01:08

## 2019-08-15 RX ADMIN — ALBUMIN (HUMAN): 12.5 SOLUTION INTRAVENOUS at 03:08

## 2019-08-15 RX ADMIN — NEOSTIGMINE METHYLSULFATE 4 MG: 1 INJECTION INTRAVENOUS at 05:08

## 2019-08-15 RX ADMIN — PHENYLEPHRINE HYDROCHLORIDE 50 MCG: 10 INJECTION INTRAVENOUS at 05:08

## 2019-08-15 RX ADMIN — GABAPENTIN 400 MG: 100 CAPSULE ORAL at 08:08

## 2019-08-15 RX ADMIN — Medication 25 MG: at 01:08

## 2019-08-15 RX ADMIN — CEFAZOLIN SODIUM 2000 MG: 500 POWDER, FOR SOLUTION INTRAMUSCULAR; INTRAVENOUS at 01:08

## 2019-08-15 RX ADMIN — FENTANYL CITRATE 50 MCG: 50 INJECTION, SOLUTION INTRAMUSCULAR; INTRAVENOUS at 02:08

## 2019-08-15 RX ADMIN — FENTANYL CITRATE 50 MCG: 50 INJECTION, SOLUTION INTRAMUSCULAR; INTRAVENOUS at 01:08

## 2019-08-15 RX ADMIN — GLYCOPYRROLATE 0.4 MG: 0.2 INJECTION, SOLUTION INTRAMUSCULAR; INTRAVENOUS at 05:08

## 2019-08-15 RX ADMIN — TRANEXAMIC ACID 156 MG: 100 INJECTION, SOLUTION INTRAVENOUS at 01:08

## 2019-08-15 RX ADMIN — SODIUM CHLORIDE 0.1 MCG/KG/MIN: 9 INJECTION, SOLUTION INTRAVENOUS at 03:08

## 2019-08-15 RX ADMIN — PROPOFOL 50 MG: 10 INJECTION, EMULSION INTRAVENOUS at 02:08

## 2019-08-15 RX ADMIN — MIDAZOLAM HYDROCHLORIDE 2 MG: 1 INJECTION, SOLUTION INTRAMUSCULAR; INTRAVENOUS at 12:08

## 2019-08-15 RX ADMIN — MORPHINE SULFATE 2 MG: 2 INJECTION, SOLUTION INTRAMUSCULAR; INTRAVENOUS at 07:08

## 2019-08-15 RX ADMIN — ACETAMINOPHEN 326.4 MG: 160 SUSPENSION ORAL at 08:08

## 2019-08-15 RX ADMIN — MORPHINE SULFATE: 10 INJECTION INTRAVENOUS at 07:08

## 2019-08-15 RX ADMIN — PROPOFOL 150 MG: 10 INJECTION, EMULSION INTRAVENOUS at 12:08

## 2019-08-15 RX ADMIN — CEFAZOLIN SODIUM 2000 MG: 500 POWDER, FOR SOLUTION INTRAMUSCULAR; INTRAVENOUS at 05:08

## 2019-08-15 RX ADMIN — CALCIUM CHLORIDE 300 MG: 100 INJECTION, SOLUTION INTRAVENOUS at 06:08

## 2019-08-15 RX ADMIN — DEXTROSE, SODIUM CHLORIDE, AND POTASSIUM CHLORIDE: 5; .9; .15 INJECTION INTRAVENOUS at 09:08

## 2019-08-15 NOTE — OP NOTE
[facility]  General Surgery  Operative Note    SUMMARY     Date of Procedure: 8/15/2019     Procedure: Procedure(s) (LRB):  FUSION, SPINE, WITH INSTRUMENTATION-OP (N/A)       Surgeon(s) and Role:     * Alexander Asif MD - Primary    Assisting Surgeon: None    Pre-Operative Diagnosis: Adolescent idiopathic scoliosis, thoracic region [M41.124]    Post-Operative Diagnosis: Post-Op Diagnosis Codes:     * Adolescent idiopathic scoliosis, thoracic region [M41.124]    Anesthesia: General    Technical Procedures Used: Posterior spine Fusion T4-L2    Description of the Findings of the Procedure: scoliosis    Significant Surgical Tasks Conducted by the Assistant(s), if Applicable: none    Complications: No    Estimated Blood Loss (EBL): 900 mL           Implants:   Implant Name Type Inv. Item Serial No.  Lot No. LRB No. Used   BONE 30CC CANCELLOUS CRUSHED - [id number]  BONE 30CC CANCELLOUS CRUSHED [id number] MUSCULOSKELETAL TRANSPLANT FND  N/A 1   BONE 30CC CANCELLOUS CRUSHED - [id number]  BONE 30CC CANCELLOUS CRUSHED [id number] MUSCULOSKELETAL TRANSPLANT FND  N/A 1   SCREW POLYAXIAL 5.0X35MM - [id number]  SCREW POLYAXIAL 5.0X35MM  ORTHOPEDIC SYSTEMS  N/A 1   SCREW UNIAXIAL 5.0X30MM - [id number]  SCREW UNIAXIAL 5.0X30MM  ORTHOPEDIC SYSTEMS  N/A 9   SCREW UNIAXIAL 5.0X35MM - [id number]  SCREW UNIAXIAL 5.0X35MM  ORTHOPEDIC SYSTEMS  N/A 6   CONNECTOR FIXED CROSS 28MM - [id number]  CONNECTOR FIXED CROSS 28MM  ORTHOPEDIC SYSTEMS  N/A 1   SET SCREW LARGE - [id number]  SET SCREW LARGE  ORTHOPEDIC SYSTEMS  N/A 17   5.5MM Cocr JOLIE      N/A 2   5.0MM W-LEMINAR HOOK      N/A 1       Specimens:   Specimen (12h ago, onward)    None                  Condition: Good    Disposition: ICU - extubated and stable.    Attestation: I was present and scrubbed for the entire procedure.    .Instrumentation: OP 5.5 Ti/Cobalt Chrome    This is a patient that comes in for posterior spinal fusion for  scoliosis. The patient and parents understand the risks and indications for the procedure.  Risks include serious neurologic injury, infection, non union, medical complications, need for revision even in the early post operative period.     Once in the OR after general anesthetic, prone positioning, preoperative antibiotics and sterile prep and drape, we began the procedure. TXA was bolused on induction and continuous through the case.  Cell saver was used. Somatosensory Evoked Potentials and Motor Evoked Potentials were established and were normal throughout the case except for about a 15-20 min period when the MAP dropped down to 50 and Motors in upper and lowers were lost.  Full potentials were regained as MAP was corrrected.  . EMGs were done on all Screws and were acceptable.    Flouro was used for starting points and to confirm screw position.     An posterior incision was made over the area to be fused.  A standard posterior approach to the spine was done.  Facetectomies and decortication were done at all levels to be fused T4- L2.  Pedicle screws were placed on the left a t4,5,7,8,9,1-,L1 and 2   On the right they were placed at  T5,7,9,11,L1 and 2.  Do to difficult screw placement, an upgoing laminar hook was placed right T4.   All screws were placed in the same way with establishment of a starting point, checked with flouro, followed by a Lenke type gearshift, probing of the channel to check compitency and then screw placement. Rods were cut and bent appropriately. The left jose was placed first. This was derotated into position.  This was followed by a Direct Vertebral Derotation en bloc derotation.  Next the right jose was placed. One crosslink was placed. All set screws and the crosslink were final tightened with the torque wrench.  Final xrays were attained that showed good correction and no complications.      We debrided the muscle edges.  The wound was soaked for 3 minutes with dilute betadine and  irrigated with 3 liters of pulse lavage with bacitracin. The spinous processes were removed and morselized and combined with other local autograft form facets and 60 of crushed cancellous allograft bone and 1 gram of vancomycin and spread across the fusion area.  Closure was with 1-0 vicryl plus sutures, sub cutaneous v-lock 2.0 suture and a 3.0 subcuticular Stratafix suture.  A drain was placed between the fascial and subcutaneous layer.  Dermabond and Prenio were placed followed by a sterile dressing.  She was awoken and taken to the PICU in stable condition.

## 2019-08-15 NOTE — ANESTHESIA PROCEDURE NOTES
Arterial    Diagnosis: Scoliosis    Patient location during procedure: done in OR  Procedure start time: 8/15/2019 12:51 PM  Timeout: 8/15/2019 12:50 PM  Procedure end time: 8/15/2019 12:52 PM    Staffing  Authorizing Provider: Marjorie Kelly MD  Performing Provider: Kenneth Martin MD    Anesthesiologist was present at the time of the procedure.    Preanesthetic Checklist  Completed: patient identified, site marked, surgical consent, pre-op evaluation, timeout performed, IV checked, risks and benefits discussed, monitors and equipment checked and anesthesia consent givenArterial  Skin Prep: chlorhexidine gluconate  Local Infiltration: none  Orientation: right  Location: radial  Catheter Size: 20 G  Catheter placement by Anatomical landmarks. Heme positive aspiration all ports.Insertion Attempts: 1  Assessment  Dressing: secured with tape and tegaderm

## 2019-08-15 NOTE — INTERVAL H&P NOTE
The patient has been examined and the H&P has been reviewed:    I concur with the findings and no changes have occurred since H&P was written.    Anesthesia/Surgery risks, benefits and alternative options discussed and understood by patient/family.          Active Hospital Problems    Diagnosis  POA    Scoliosis [M41.9]  Yes      Resolved Hospital Problems   No resolved problems to display.

## 2019-08-15 NOTE — ANESTHESIA PREPROCEDURE EVALUATION
[facility]  Anesthesia Pre-Operative Evaluation         Patient Name: Columba Allison  YOB: 2000  MRN: [id number]    SUBJECTIVE:     Pre-operative evaluation for Procedure(s) (LRB):  FUSION, SPINE, WITH INSTRUMENTATION-OP (N/A)     08/15/2019    Columba Allison is a 18 y.o. female w/ a significant PMHx of anxiety, thoracic scoliosis. Otherwise no significant PMHx.    Patient now presents for the above procedure(s).    NPO since 11pm 8/14/19. No previous anesthesia. No family history of anesthesia complications.      Prev airway: None documented.          Patient Active Problem List   Diagnosis    Ganglion cyst    Idiopathic scoliosis    KRISTY (generalized anxiety disorder)    Scoliosis       Review of patient's allergies indicates:  No Known Allergies    Current Inpatient Medications:   ceFAZolin (ANCEF) IV syringe (PEDS)  2,000 mg Intravenous Once Pre-Op    clindamycin (CLEOCIN) IVPB  600 mg Intravenous Once Pre-Op    gabapentin  400 mg Oral TID       No current facility-administered medications on file prior to encounter.      Current Outpatient Medications on File Prior to Encounter   Medication Sig Dispense Refill    ALPRAZolam (XANAX) 0.25 MG tablet 1 tab po daily only as needed for severe anxiety 30 tablet 0    MARLISSA 0.15-0.03 mg per tablet          History reviewed. No pertinent surgical history.    Social History     Socioeconomic History    Marital status: Single     Spouse name: Not on file    Number of children: Not on file    Years of education: Not on file    Highest education level: Not on file   Occupational History    Not on file   Social Needs    Financial resource strain: Not on file    Food insecurity:     Worry: Not on file     Inability: Not on file    Transportation needs:     Medical: Not on file     Non-medical: Not on file   Tobacco Use    Smoking status: Never Smoker    Smokeless tobacco: Never Used   Substance and Sexual Activity     Alcohol use: No    Drug use: No    Sexual activity: Never   Lifestyle    Physical activity:     Days per week: Not on file     Minutes per session: Not on file    Stress: Not on file   Relationships    Social connections:     Talks on phone: Not on file     Gets together: Not on file     Attends Scientologist service: Not on file     Active member of club or organization: Not on file     Attends meetings of clubs or organizations: Not on file     Relationship status: Not on file   Other Topics Concern    Not on file   Social History Narrative    Lives with mom, sister and stepfather; dad involved, lives in town; one dog       OBJECTIVE:     Vital Signs Range (Last 24H):  Temp:  [37.1 °C (98.7 °F)]   Pulse:  [76]   Resp:  [18]   BP: (107)/(64)   SpO2:  [100 %]       Significant Labs:  Lab Results   Component Value Date    WBC 8.27 07/30/2019    HGB 14.2 07/30/2019    HCT 44.8 07/30/2019     (H) 07/30/2019    CHOL 149 12/10/2018    TRIG 69 12/10/2018    HDL 48 12/10/2018    ALT 17 07/30/2019    AST 19 07/30/2019     07/30/2019    K 4.7 07/30/2019     07/30/2019    CREATININE 0.8 07/30/2019    BUN 8 07/30/2019    CO2 27 07/30/2019    TSH 2.531 12/10/2018    INR 1.1 07/30/2019    HGBA1C 4.9 12/10/2018       Diagnostic Studies: No relevant studies.    EKG:   No results found for this or any previous visit.    2D ECHO:  TTE:  No results found for this or any previous visit.    SHAZIA:  No results found for this or any previous visit.    ASSESSMENT/PLAN:         Anesthesia Evaluation    I have reviewed the Patient Summary Reports.     I have reviewed the Medications.     Review of Systems  Anesthesia Hx:  No previous Anesthesia  Neg history of prior surgery. Denies Family Hx of Anesthesia complications.    EENT/Dental:EENT/Dental Normal   Cardiovascular:  Cardiovascular Normal Exercise tolerance: good     Pulmonary:  Pulmonary Normal    Hepatic/GI:  Hepatic/GI Normal    Musculoskeletal:   Thoracic  scoliosis   Neurological:  Neurology Normal    Endocrine:  Endocrine Normal    Psych:   anxiety          Physical Exam  General:  Well nourished    Airway/Jaw/Neck:  Airway Findings: Mouth Opening: Normal Tongue: Normal  General Airway Assessment: Adult  Mallampati: I  TM Distance: Normal, at least 6 cm  Jaw/Neck Findings:  Neck ROM: Normal ROM      Dental:  Dental Findings: In tact   Chest/Lungs:  Chest/Lungs Findings: Clear to auscultation, Normal Respiratory Rate     Heart/Vascular:  Heart Findings: Rate: Normal  Rhythm: Regular Rhythm     Abdomen:  Abdomen Findings: Normal      Mental Status:  Mental Status Findings:  Cooperative, Alert and Oriented         Anesthesia Plan  Type of Anesthesia, risks & benefits discussed:  Anesthesia Type:  general  Patient's Preference:   Intra-op Monitoring Plan: standard ASA monitors and arterial line  Intra-op Monitoring Plan Comments:   Post Op Pain Control Plan: multimodal analgesia, IV/PO Opioids PRN and per primary service following discharge from PACU  Post Op Pain Control Plan Comments:   Induction:   IV  Beta Blocker:  Patient is not currently on a Beta-Blocker (No further documentation required).       Informed Consent: Patient understands risks and agrees with Anesthesia plan.  Questions answered. Anesthesia consent signed with patient.  ASA Score: 2     Day of Surgery Review of History & Physical:            Ready For Surgery From Anesthesia Perspective.

## 2019-08-15 NOTE — BRIEF OP NOTE
[facility]  Brief Operative Note    SUMMARY     Surgery Date: 8/15/2019     Surgeon(s) and Role:     * Alexander Asif MD - Primary    Assisting Surgeon: None    Pre-op Diagnosis:  Adolescent idiopathic scoliosis, thoracic region [M41.124]    Post-op Diagnosis:  Post-Op Diagnosis Codes:     * Adolescent idiopathic scoliosis, thoracic region [M41.124]    Procedure(s) (LRB):  FUSION, SPINE, WITH INSTRUMENTATION-OP (N/A)    Anesthesia: General    Description of Procedure: Posterior spinal fusion T4-L2    Description of the findings of the procedure: Posterior spinal fusion T4-L2    Estimated Blood Loss: 900 mL         Specimens:   Specimen (12h ago, onward)    None      Seen simultaneously with resident and agree with above assessment and plan.

## 2019-08-16 LAB
ANION GAP SERPL CALC-SCNC: 5 MMOL/L (ref 8–16)
BASOPHILS # BLD AUTO: 0.02 K/UL (ref 0–0.2)
BASOPHILS NFR BLD: 0.2 % (ref 0–1.9)
BUN SERPL-MCNC: 5 MG/DL (ref 6–20)
CALCIUM SERPL-MCNC: 8 MG/DL (ref 8.7–10.5)
CHLORIDE SERPL-SCNC: 114 MMOL/L (ref 95–110)
CO2 SERPL-SCNC: 22 MMOL/L (ref 23–29)
CREAT SERPL-MCNC: 0.6 MG/DL (ref 0.5–1.4)
DIFFERENTIAL METHOD: ABNORMAL
EOSINOPHIL # BLD AUTO: 0 K/UL (ref 0–0.5)
EOSINOPHIL NFR BLD: 0.2 % (ref 0–8)
ERYTHROCYTE [DISTWIDTH] IN BLOOD BY AUTOMATED COUNT: 13 % (ref 11.5–14.5)
EST. GFR  (AFRICAN AMERICAN): >60 ML/MIN/1.73 M^2
EST. GFR  (NON AFRICAN AMERICAN): >60 ML/MIN/1.73 M^2
GLUCOSE SERPL-MCNC: 111 MG/DL (ref 70–110)
HCT VFR BLD AUTO: 26.7 % (ref 37–48.5)
HGB BLD-MCNC: 8.4 G/DL (ref 12–16)
IMM GRANULOCYTES # BLD AUTO: 0.04 K/UL (ref 0–0.04)
IMM GRANULOCYTES NFR BLD AUTO: 0.4 % (ref 0–0.5)
LYMPHOCYTES # BLD AUTO: 1.2 K/UL (ref 1–4.8)
LYMPHOCYTES NFR BLD: 12.7 % (ref 18–48)
MAGNESIUM SERPL-MCNC: 1.9 MG/DL (ref 1.6–2.6)
MCH RBC QN AUTO: 30.2 PG (ref 27–31)
MCHC RBC AUTO-ENTMCNC: 31.5 G/DL (ref 32–36)
MCV RBC AUTO: 96 FL (ref 82–98)
MONOCYTES # BLD AUTO: 0.5 K/UL (ref 0.3–1)
MONOCYTES NFR BLD: 5.1 % (ref 4–15)
NEUTROPHILS # BLD AUTO: 7.6 K/UL (ref 1.8–7.7)
NEUTROPHILS NFR BLD: 81.4 % (ref 38–73)
NRBC BLD-RTO: 0 /100 WBC
PHOSPHATE SERPL-MCNC: 3.7 MG/DL (ref 2.7–4.5)
PLATELET # BLD AUTO: 218 K/UL (ref 150–350)
PMV BLD AUTO: 10.5 FL (ref 9.2–12.9)
POTASSIUM SERPL-SCNC: 4.9 MMOL/L (ref 3.5–5.1)
RBC # BLD AUTO: 2.78 M/UL (ref 4–5.4)
SODIUM SERPL-SCNC: 141 MMOL/L (ref 136–145)
WBC # BLD AUTO: 9.38 K/UL (ref 3.9–12.7)

## 2019-08-16 PROCEDURE — 97161 PT EVAL LOW COMPLEX 20 MIN: CPT

## 2019-08-16 PROCEDURE — 94761 N-INVAS EAR/PLS OXIMETRY MLT: CPT

## 2019-08-16 PROCEDURE — 25000003 PHARM REV CODE 250: Performed by: STUDENT IN AN ORGANIZED HEALTH CARE EDUCATION/TRAINING PROGRAM

## 2019-08-16 PROCEDURE — 99233 PR SUBSEQUENT HOSPITAL CARE,LEVL III: ICD-10-PCS | Mod: ,,, | Performed by: PEDIATRICS

## 2019-08-16 PROCEDURE — 25000003 PHARM REV CODE 250: Performed by: PEDIATRICS

## 2019-08-16 PROCEDURE — 11300000 HC PEDIATRIC PRIVATE ROOM

## 2019-08-16 PROCEDURE — 27000221 HC OXYGEN, UP TO 24 HOURS

## 2019-08-16 PROCEDURE — 25000003 PHARM REV CODE 250: Performed by: NURSE PRACTITIONER

## 2019-08-16 PROCEDURE — 99900035 HC TECH TIME PER 15 MIN (STAT)

## 2019-08-16 PROCEDURE — 97530 THERAPEUTIC ACTIVITIES: CPT

## 2019-08-16 PROCEDURE — 94770 HC EXHALED C02 TEST: CPT

## 2019-08-16 PROCEDURE — S0028 INJECTION, FAMOTIDINE, 20 MG: HCPCS | Performed by: PEDIATRICS

## 2019-08-16 PROCEDURE — 80048 BASIC METABOLIC PNL TOTAL CA: CPT

## 2019-08-16 PROCEDURE — 99233 SBSQ HOSP IP/OBS HIGH 50: CPT | Mod: ,,, | Performed by: PEDIATRICS

## 2019-08-16 PROCEDURE — S0077 INJECTION, CLINDAMYCIN PHOSP: HCPCS | Performed by: STUDENT IN AN ORGANIZED HEALTH CARE EDUCATION/TRAINING PROGRAM

## 2019-08-16 PROCEDURE — 83735 ASSAY OF MAGNESIUM: CPT

## 2019-08-16 PROCEDURE — 97116 GAIT TRAINING THERAPY: CPT

## 2019-08-16 PROCEDURE — 85025 COMPLETE CBC W/AUTO DIFF WBC: CPT

## 2019-08-16 PROCEDURE — 25000003 PHARM REV CODE 250: Performed by: ORTHOPAEDIC SURGERY

## 2019-08-16 PROCEDURE — 84100 ASSAY OF PHOSPHORUS: CPT

## 2019-08-16 PROCEDURE — 63600175 PHARM REV CODE 636 W HCPCS: Performed by: STUDENT IN AN ORGANIZED HEALTH CARE EDUCATION/TRAINING PROGRAM

## 2019-08-16 RX ORDER — OXYCODONE HCL 10 MG/1
10 TABLET, FILM COATED, EXTENDED RELEASE ORAL EVERY 12 HOURS
Status: DISCONTINUED | OUTPATIENT
Start: 2019-08-16 | End: 2019-08-18 | Stop reason: HOSPADM

## 2019-08-16 RX ORDER — ACETAMINOPHEN 160 MG/5ML
500 SOLUTION ORAL EVERY 6 HOURS
Status: DISCONTINUED | OUTPATIENT
Start: 2019-08-16 | End: 2019-08-17

## 2019-08-16 RX ORDER — MORPHINE SULFATE 2 MG/ML
1 INJECTION, SOLUTION INTRAMUSCULAR; INTRAVENOUS
Status: DISCONTINUED | OUTPATIENT
Start: 2019-08-16 | End: 2019-08-16

## 2019-08-16 RX ORDER — BISACODYL 10 MG
10 SUPPOSITORY, RECTAL RECTAL 2 TIMES DAILY PRN
Status: DISCONTINUED | OUTPATIENT
Start: 2019-08-16 | End: 2019-08-16

## 2019-08-16 RX ORDER — GABAPENTIN 300 MG/1
300 CAPSULE ORAL 3 TIMES DAILY
Status: DISCONTINUED | OUTPATIENT
Start: 2019-08-16 | End: 2019-08-18 | Stop reason: HOSPADM

## 2019-08-16 RX ORDER — OXYCODONE HCL 10 MG/1
10 TABLET, FILM COATED, EXTENDED RELEASE ORAL EVERY 12 HOURS
Status: DISCONTINUED | OUTPATIENT
Start: 2019-08-16 | End: 2019-08-16

## 2019-08-16 RX ORDER — ESCITALOPRAM OXALATE 10 MG/1
10 TABLET ORAL NIGHTLY
Status: DISCONTINUED | OUTPATIENT
Start: 2019-08-17 | End: 2019-08-18 | Stop reason: HOSPADM

## 2019-08-16 RX ORDER — OXYCODONE HYDROCHLORIDE 5 MG/1
5 TABLET ORAL EVERY 6 HOURS PRN
Status: DISCONTINUED | OUTPATIENT
Start: 2019-08-16 | End: 2019-08-16

## 2019-08-16 RX ORDER — HYDROCODONE BITARTRATE AND ACETAMINOPHEN 7.5; 325 MG/1; MG/1
1 TABLET ORAL EVERY 6 HOURS
Status: DISCONTINUED | OUTPATIENT
Start: 2019-08-16 | End: 2019-08-17

## 2019-08-16 RX ORDER — POLYETHYLENE GLYCOL 3350 17 G/17G
17 POWDER, FOR SOLUTION ORAL DAILY
Status: DISCONTINUED | OUTPATIENT
Start: 2019-08-16 | End: 2019-08-18 | Stop reason: HOSPADM

## 2019-08-16 RX ORDER — MORPHINE SULFATE 2 MG/ML
INJECTION, SOLUTION INTRAMUSCULAR; INTRAVENOUS
Status: DISPENSED
Start: 2019-08-16 | End: 2019-08-16

## 2019-08-16 RX ORDER — ONDANSETRON 4 MG/1
4 TABLET, ORALLY DISINTEGRATING ORAL EVERY 6 HOURS PRN
Status: DISCONTINUED | OUTPATIENT
Start: 2019-08-16 | End: 2019-08-18 | Stop reason: HOSPADM

## 2019-08-16 RX ADMIN — METHOCARBAMOL TABLETS 750 MG: 750 TABLET, COATED ORAL at 08:08

## 2019-08-16 RX ADMIN — MAGNESIUM HYDROXIDE 2400 MG: 400 SUSPENSION ORAL at 09:08

## 2019-08-16 RX ADMIN — CLINDAMYCIN IN 5 PERCENT DEXTROSE 600 MG: 12 INJECTION, SOLUTION INTRAVENOUS at 05:08

## 2019-08-16 RX ADMIN — DEXTROSE, SODIUM CHLORIDE, AND POTASSIUM CHLORIDE: 5; .9; .15 INJECTION INTRAVENOUS at 07:08

## 2019-08-16 RX ADMIN — ACETAMINOPHEN 326.4 MG: 160 SUSPENSION ORAL at 12:08

## 2019-08-16 RX ADMIN — METHOCARBAMOL TABLETS 750 MG: 750 TABLET, COATED ORAL at 05:08

## 2019-08-16 RX ADMIN — OXYCODONE HYDROCHLORIDE 10 MG: 10 TABLET, FILM COATED, EXTENDED RELEASE ORAL at 09:08

## 2019-08-16 RX ADMIN — GABAPENTIN 300 MG: 300 CAPSULE ORAL at 03:08

## 2019-08-16 RX ADMIN — OXYCODONE HYDROCHLORIDE 10 MG: 10 TABLET, FILM COATED, EXTENDED RELEASE ORAL at 02:08

## 2019-08-16 RX ADMIN — HYDROCODONE BITARTRATE AND ACETAMINOPHEN 1 TABLET: 7.5; 325 TABLET ORAL at 05:08

## 2019-08-16 RX ADMIN — ACETAMINOPHEN 499.2 MG: 160 SUSPENSION ORAL at 12:08

## 2019-08-16 RX ADMIN — MAGNESIUM HYDROXIDE 2400 MG: 400 SUSPENSION ORAL at 08:08

## 2019-08-16 RX ADMIN — KETOROLAC TROMETHAMINE 10 MG: 10 TABLET, FILM COATED ORAL at 08:08

## 2019-08-16 RX ADMIN — METHOCARBAMOL TABLETS 750 MG: 750 TABLET, COATED ORAL at 09:08

## 2019-08-16 RX ADMIN — FAMOTIDINE 20 MG: 10 INJECTION, SOLUTION INTRAVENOUS at 08:08

## 2019-08-16 RX ADMIN — ACETAMINOPHEN 326.4 MG: 160 SUSPENSION ORAL at 05:08

## 2019-08-16 RX ADMIN — GABAPENTIN 400 MG: 100 CAPSULE ORAL at 08:08

## 2019-08-16 RX ADMIN — OXYCODONE HYDROCHLORIDE 5 MG: 5 TABLET ORAL at 08:08

## 2019-08-16 RX ADMIN — CLINDAMYCIN IN 5 PERCENT DEXTROSE 600 MG: 12 INJECTION, SOLUTION INTRAVENOUS at 12:08

## 2019-08-16 RX ADMIN — POLYETHYLENE GLYCOL 3350 17 G: 17 POWDER, FOR SOLUTION ORAL at 12:08

## 2019-08-16 RX ADMIN — METHOCARBAMOL TABLETS 750 MG: 750 TABLET, COATED ORAL at 12:08

## 2019-08-16 RX ADMIN — FAMOTIDINE 20 MG: 10 INJECTION, SOLUTION INTRAVENOUS at 09:08

## 2019-08-16 RX ADMIN — HYDROCODONE BITARTRATE AND ACETAMINOPHEN 1 TABLET: 7.5; 325 TABLET ORAL at 12:08

## 2019-08-16 RX ADMIN — ACETAMINOPHEN 499.2 MG: 160 SUSPENSION ORAL at 05:08

## 2019-08-16 RX ADMIN — ESCITALOPRAM OXALATE 10 MG: 5 TABLET, FILM COATED ORAL at 08:08

## 2019-08-16 RX ADMIN — GABAPENTIN 300 MG: 300 CAPSULE ORAL at 09:08

## 2019-08-16 RX ADMIN — MORPHINE SULFATE 1 MG: 2 INJECTION, SOLUTION INTRAMUSCULAR; INTRAVENOUS at 07:08

## 2019-08-16 RX ADMIN — CLINDAMYCIN IN 5 PERCENT DEXTROSE 600 MG: 12 INJECTION, SOLUTION INTRAVENOUS at 07:08

## 2019-08-16 NOTE — PT/OT/SLP EVAL
Physical Therapy  Evaluation and Treatment    Columba Allison   [id number]    Time Tracking:     PT Received On: 08/16/19   PT Start Time: 0910   PT Stop Time: 0952   PT Total Time (min): 42 min    Billable Minutes: Evaluation 15, Gait Training 12 and Therapeutic Activity 15      Recommendations:     Discharge recommendations: Home with family     Equipment recommendations: Bedside Commode (has low toilets at home per mom)    Barriers to Discharge: None    Patient Information:     Recent Surgery: s/p T4-L2 PSF on 8/15/19    Diagnosis: Scoliosis    General Precautions: Standard, fall, WBAT, no brace  Orthopedic Precautions: Spinal precautions (avoid bending, lifting, twisting)    Assessment:     Columba Allison is a 18 y.o. female admitted to [facility] on 8/15/19 for Scoliosis, underwent T4-L2 PSF. Columba Allison tolerated evaluation well today. Pre-medicated for session, pain consistently 8/10 throughout session but seems increased with sitting up at EOB compared to lying down or standing. Ambulates 220 ft around PICU (1 loop) with R hand-held assist of therapist + contact-guard at L hip for safety. Does have c/o occasional dizziness with activity but never needed seated rest break with activity. Educated on spinal precautions (avoiding bending, lifting, twisting), mom verbalized understanding but patient needs further education. Ordered bedside commode for patient to utilize; will also need bedside commode for home since toilets are low per mom. Discussed PT role, POC, goals and recommendations (home with family; bedside commode) with patient and/or family; verbalized understanding. Columba Allison would benefit from acute PT services to promote mobility during this admission and improve return to PLOF.    Problem List: weakness, decreased endurance, impaired self-care skills, impaired mobility, decreased sitting or standing balance, gait instability, orthopedic and/or sternal precautions, impaired cardiopulmonary  response to activity and pain    Rehab Prognosis: Good; patient would benefit from acute skilled PT services to address these deficits and reach maximum level of function.    Plan:     Patient to be seen daily to address the above listed problems via gait training, therapeutic activities, therapeutic exercises    Plan of Care Expires: 09/15/19  Plan of Care reviewed with: patient, mother    Subjective:     Communicated with GHADA Donohue prior to evaluation, appropriate to see for evaluation.    Pt found supine in bed (HOB elevated) upon PT entry to room, agreeable to evaluation.    Does this patient have any cultural, spiritual, Temple conflicts given the current situation? Patient has no barriers to learning. Patient verbalizes understanding of his/her program and goals and demonstrates them correctly. No cultural, spiritual, or educational needs identified.    Past Medical History:   Diagnosis Date    KRISTY (generalized anxiety disorder) 12/11/2018    Scoliosis      History reviewed. No pertinent surgical history.    Living Environment:  Pt splits time between living with her mom and dad at their respective homes at baseline. Reports upon discharge she will be staying at her father's house which is a 1 SH with 0 JORDY. Mom's house has flight of stairs to ascend/descend.    PLOF:  Prior to admission, patient was independent with mobility and ADL's. She is taking a break this semester from college. She did a full year at [employer] for her freshman year of college but did not enjoy it. She reports having baseline anxiety, did not enjoy being away from home with no friends (living in a dorm with strangers). Reports recently starting lexapro and she feels more at ease in general, mom notices a difference as well. She will be going to college at [employer] once she returns in January (taking this semester off to recover).    DME:  Patient owns or has access to the following DME: None    Upon discharge, patient will have  assistance from parents.    Objective:     Patient found with: blood pressure cuff, schreiber catheter, telemetry, pulse ox (continuous), peripheral IV, SCD, hemovac    Pain:  Pain Rating 1: 8/10 at generalized back; pre-medicated for pain  Pain Rating Post-Intervention 1: 8/10 at generalized back, nsg aware    Cognitive Exam:  Patient is oriented to Person, Place, Time and Situation.  Patient follows 100% of single-step commands.    Sensation:   Intact    Lower Extremity Range of Motion:  Right Lower Extremity: WFL  Left Lower Extremity: WFL    Lower Extremity Strength:  Right Lower Extremity: grossly 4/5 via MMT at EOB  Left Lower Extremity: grossly 4/5 via MMT at EOB    Functional Mobility:    · Bed Mobility:  · Rolling to R: min (A)  · Supine to Sitting: mod (A) via R sidelying    · Scooting towards EOB in sitting: min (A) via side-to-side weightshifting    · Transfers:  · Sit to Stand: min (A) from EOB with R HHA x 1 trial(s)  · Stand to Sit: min (A) to BS chair with R HHA (cueing to reach for armrest with L hand) x 1 trial(s)    · Gait:  · 220 feet around PICU (1 loop) with R hand-held assist of therapist + contact-guard at L hip for safety. Does have c/o occasional dizziness with activity but never needed seated rest break with activity    · Assist level: Contact-Guard Assist  · Device: Hand-held assist x 1    · Balance:  · Static Sit: Stand-By Assist at EOB  · Static Stand: Contact-Guard Assist with Hand-held assist x 1    Additional Therapeutic Activity/Exercises:     1. Educated on spinal precautions (avoiding bending, lifting, twisting), mom verbalized understanding but patient needs further education. Ordered bedside commode for patient to utilize; will also need bedside commode for home since toilets are low per mom.    2. Discussed PT role, POC, goals and recommendations with patient and/or family; verbalized understanding.    AM-PAC 6 CLICK MOBILITY  Turning over in bed (including adjusting bedclothes,  sheets and blankets)?: 3  Sitting down on and standing up from a chair with arms (e.g., wheelchair, bedside commode, etc.): 3  Moving from lying on back to sitting on the side of the bed?: 3  Moving to and from a bed to a chair (including a wheelchair)?: 3  Need to walk in hospital room?: 3  Climbing 3-5 steps with a railing?: 2  Basic Mobility Total Score: 17    Patient was left sitting up in bedside chair with all lines intact, call button in reach and RN, mom present.    Clinical Decision Making for Evaluation Complexity:  1. Body System(s) Examination: 1-2  2. Clinical Presentation: Evolving  3. Evaluation Complexity: Low    GOALS:   Multidisciplinary Problems     Physical Therapy Goals        Problem: Physical Therapy Goal    Goal Priority Disciplines Outcome Goal Variances Interventions   Physical Therapy Goal     PT, PT/OT      Description:  Goals to be met by: 8/20/19      Pt will benefit from acute PT services to work towards the following goals:     1. Pt will demonstrate log rolling left or right with Stand-By Assist without cueing - Not met  2. Supine to sit with Stand-By Assist within spinal precautions with HOB < 30 deg - Not met  3. Sit to stand from appropriately-sized chair with Stand-By Assist within spinal precautions - Not met  4. Pt will ambulate 400 ft with Stand-By Assist - Not met  5. Patient and family will verbalize and demonstrate understanding of spinal precautions - Not met                    Dmitri Curtis, PT  8/16/2019

## 2019-08-16 NOTE — PLAN OF CARE
Pt stable, afebrile. No distress noted. Pt reports pain, but tolerable. Pt reports feeling better. PIVs SL, CDI. Medications administered per MAR. No PRNs needed. Pt drinking well and had a couple of crackers. No nausea reported. Pt walking with minimal assistance to the bathroom. Voiding well. No BM on shift. Island dressing to back CDI, no drainage noted. Hemovac drain set to full suction. SCDs applied. Plan of care reviewed, will cont to monitor.

## 2019-08-16 NOTE — NURSING
Nursing Transfer Note    Receiving Transfer Note    8/16/2019 12:00 PM  Received in transfer from PICU to PEDS  Report received as documented in PER Handoff on Doc Flowsheet.  See Doc Flowsheet for VS's and complete assessment.  Continuous EKG monitoring in place No  Chart received with patient: Yes  What Caregiver / Guardian was Notified of Arrival: Mother  Patient and / or caregiver / guardian oriented to room and nurse call system.  MARNI Castrejon RN  8/16/2019 12:00 PM

## 2019-08-16 NOTE — ASSESSMENT & PLAN NOTE
Pain control: multimodal  PT/OT: WBAT   DVT PPx: SCDs at all times when not ambulating  Abx: postop Clindamycin; d/c after drain is pulled  Labs: Hgb 8.4; will monitor  Drain: 160 cc since surgery; d/c once below 30 cc output over 8 hour period  Ryan: d/c after mobilize with PT    Dispo: f/u PT recs; will consider d/c post-op day 2-3

## 2019-08-16 NOTE — NURSING
Nursing Transfer Note    Receiving Transfer Note    8/15/2019 1925 PM  Received in transfer from PACU to PICU 13  Report received as documented in PER Handoff on Doc Flowsheet.  See Doc Flowsheet for VS's and complete assessment.  Continuous EKG monitoring in place Yes   Chart received with patient: Yes  What Caregiver / Guardian was Notified of Arrival: Parents  Patient and / or caregiver / guardian oriented to room and nurse call system.  CLEMENTE Tejeda RN  8/15/2019 1928

## 2019-08-16 NOTE — NURSING TRANSFER
Nursing Transfer Note    Sending Transfer Note      8/16/2019 11:48 AM  Transfer via wheelchair  From [facility] 13 to Peds 404   Transfered with belongings  Transported by: Jessica Daniels RN    Report given as documented in PER Handoff on Doc Flowsheet  VS's per Doc Flowsheet  Medicines sent: Yes  Chart sent with patient: Yes  What caregiver / guardian was Notified of transfer:Mother  KERLINE MartínezGHADA gold  8/16/2019 11:48 AM

## 2019-08-16 NOTE — SUBJECTIVE & OBJECTIVE
"Principal Problem:Scoliosis    Principal Orthopedic Problem: Post-Op Day 1; PLF T4-L2    Interval History: Patient examined at bedside. No acute events overnight. Patient complaining of moderate to severe pain which kept her up through the night, worsened by movement. Drain output 160 cc since surgery.     Review of patient's allergies indicates:  No Known Allergies    Current Facility-Administered Medications   Medication    acetaminophen 32 mg/mL liquid (PEDS) 326.4 mg    bisacodyl suppository 10 mg    clindamycin 600 MG/50 ML D5W 600 mg/50 mL IVPB 600 mg    dextrose 5 % and 0.9 % NaCl with KCl 20 mEq infusion    diazePAM 1 mg/mL oral solution 1 mg    diazePAM injection 1 mg    escitalopram oxalate tablet 10 mg    famotidine (PF) injection 20 mg    gabapentin capsule 300 mg    HYDROcodone-acetaminophen 7.5-325 mg per tablet 1 tablet    ketorolac tablet 10 mg    magnesium hydroxide 400 mg/5 ml suspension 2,400 mg    methocarbamol tablet 750 mg    morphine 2 mg/mL injection    morphine injection 1 mg    naloxone 0.4 mg/mL injection 0.02 mg    oxyCODONE 12 hr tablet 10 mg    oxyCODONE immediate release tablet 5 mg     Objective:     Vital Signs (Most Recent):  Temp: 97.8 °F (36.6 °C) (08/16/19 0400)  Pulse: 81 (08/16/19 0700)  Resp: (!) 24 (08/16/19 0700)  BP: (!) 86/54 (08/16/19 0700)  SpO2: 99 % (08/16/19 0700) Vital Signs (24h Range):  Temp:  [97.6 °F (36.4 °C)-98.2 °F (36.8 °C)] 97.8 °F (36.6 °C)  Pulse:  [] 81  Resp:  [8-24] 24  SpO2:  [92 %-100 %] 99 %  BP: ()/(50-76) 86/54  Arterial Line BP: ()/(41-56) 110/49     Weight: 52.2 kg (115 lb)  Height: 5' 4" (162.6 cm)  Body mass index is 19.74 kg/m².      Intake/Output Summary (Last 24 hours) at 8/16/2019 0953  Last data filed at 8/16/2019 0738  Gross per 24 hour   Intake 9346.97 ml   Output 5470 ml   Net 3876.97 ml       Ortho/SPM Exam   PE:  Gen:  No acute distress  CV:  Peripherally well-perfused.    Lungs:  Normal respiratory " effort.  Head/Neck:  Normocephalic.  Atraumatic.     Spine:   Wound dressings c/d/i    BUE:  SILT M/U/R  Motor intact AIN/PIN/M/U/R   Cap refill < 2s  2+ RP    BLE:  SILT Sa/Hill/DP/SP/T  Motor intact EHL/FHL/TA/Gastroc  2+ DP, 2+ PT        Significant Labs:   CBC:   Recent Labs   Lab 08/15/19  1619 08/15/19  1808 08/16/19  0007   WBC  --   --  9.38   HGB  --   --  8.4*   HCT 26* 23* 26.7*   PLT  --   --  218     All pertinent labs within the past 24 hours have been reviewed.    Significant Imaging: I have reviewed and interpreted all pertinent imaging results/findings.

## 2019-08-16 NOTE — PROGRESS NOTES
[facility]  Orthopedics  Progress Note    Patient Name: Columba Allison  MRN: [id number]  Admission Date: 8/15/2019  Hospital Length of Stay: 1 days  Attending Provider: Alexander Asif MD  Primary Care Provider: Neymar Chirinos MD  Follow-up For: Procedure(s) (LRB):  FUSION, SPINE, WITH INSTRUMENTATION-OP (N/A)    Post-Operative Day: 1 Day Post-Op  Subjective:     Principal Problem:Scoliosis    Principal Orthopedic Problem: Post-Op Day 1; PLF T4-L2    Interval History: Patient examined at bedside. No acute events overnight. Patient complaining of moderate to severe pain which kept her up through the night, worsened by movement. Drain output 160 cc since surgery.     Review of patient's allergies indicates:  No Known Allergies    Current Facility-Administered Medications   Medication    acetaminophen 32 mg/mL liquid (PEDS) 326.4 mg    bisacodyl suppository 10 mg    clindamycin 600 MG/50 ML D5W 600 mg/50 mL IVPB 600 mg    dextrose 5 % and 0.9 % NaCl with KCl 20 mEq infusion    diazePAM 1 mg/mL oral solution 1 mg    diazePAM injection 1 mg    escitalopram oxalate tablet 10 mg    famotidine (PF) injection 20 mg    gabapentin capsule 300 mg    HYDROcodone-acetaminophen 7.5-325 mg per tablet 1 tablet    ketorolac tablet 10 mg    magnesium hydroxide 400 mg/5 ml suspension 2,400 mg    methocarbamol tablet 750 mg    morphine 2 mg/mL injection    morphine injection 1 mg    naloxone 0.4 mg/mL injection 0.02 mg    oxyCODONE 12 hr tablet 10 mg    oxyCODONE immediate release tablet 5 mg     Objective:     Vital Signs (Most Recent):  Temp: 97.8 °F (36.6 °C) (08/16/19 0400)  Pulse: 81 (08/16/19 0700)  Resp: (!) 24 (08/16/19 0700)  BP: (!) 86/54 (08/16/19 0700)  SpO2: 99 % (08/16/19 0700) Vital Signs (24h Range):  Temp:  [97.6 °F (36.4 °C)-98.2 °F (36.8 °C)] 97.8 °F (36.6 °C)  Pulse:  [] 81  Resp:  [8-24] 24  SpO2:  [92 %-100 %] 99 %  BP: ()/(50-76) 86/54  Arterial Line BP:  "(93136)/(41-56) 110/49     Weight: 52.2 kg (115 lb)  Height: 5' 4" (162.6 cm)  Body mass index is 19.74 kg/m².      Intake/Output Summary (Last 24 hours) at 8/16/2019 0929  Last data filed at 8/16/2019 0738  Gross per 24 hour   Intake 9346.97 ml   Output 5470 ml   Net 3876.97 ml       Ortho/SPM Exam   PE:  Gen:  No acute distress  CV:  Peripherally well-perfused.    Lungs:  Normal respiratory effort.  Head/Neck:  Normocephalic.  Atraumatic.      Spine:   Wound dressings c/d/i    BUE:  SILT M/U/R  Motor intact AIN/PIN/M/U/R   Cap refill < 2s  2+ RP    BLE:  SILT Sa/Hill/DP/SP/T  Motor intact EHL/FHL/TA/Gastroc  2+ DP, 2+ PT        Significant Labs:   CBC:   Recent Labs   Lab 08/15/19  1619 08/15/19  1808 08/16/19  0007   WBC  --   --  9.38   HGB  --   --  8.4*   HCT 26* 23* 26.7*   PLT  --   --  218     All pertinent labs within the past 24 hours have been reviewed.    Significant Imaging: I have reviewed and interpreted all pertinent imaging results/findings.    Assessment/Plan:     * Scoliosis    Pain control: multimodal  PT/OT: WBAT   DVT PPx: SCDs at all times when not ambulating  Abx: postop Clindamycin; d/c after drain is pulled  Labs: Hgb 8.4; will monitor  Drain: 160 cc since surgery; d/c once below 30 cc output over 8 hour period  Ryan: d/c after mobilize with PT    Dispo: f/u PT recs; will consider d/c post-op day 2-3            Joe Sharma MD  Orthopedics  [facility]    Seen simultaneously with resident and agree with above assessment and plan.      "

## 2019-08-16 NOTE — PLAN OF CARE
Problem: Adult Inpatient Plan of Care  Goal: Plan of Care Review  POC reviewed with patient and parents. Afebrile overnight, Morphine PCA discontinued because of shallow respirations. O2 @ 1L via NC with End tidal monitor in place.  Morphine PRN Q2 hrs ordered. Tylenol OTC. Valium prn for muscle spasms, Toradol, OxyContin prn for pain. Dressing to back C/D/I with Hemovac drain. Tolerating clear diet, drinking lots of clears very positive MD aware, Ryan to gravity. Will get up with PT and may transfer to floor later today.

## 2019-08-16 NOTE — H&P
[facility]  Pediatric Critical Care  History & Physical      Patient Name: Columba Allison  MRN: [id number]  Admission Date: 8/15/2019  Code Status: No Order   Attending Provider: Alexander Asif MD    Primary Care Physician: Neymar Chirinos MD  Principal Problem:Scoliosis    Patient information was obtained from past medical records    Subjective:     HPI: The patient is a 18 y.o. female with PMH of generalized anxiety disorder and adolescent idiopathic scoliosis who presents after posterior spine fusion T4-L2.     Pt experienced approximately 900 cc blood loss in the OR and received 1 L ablumin and 5.2 L Isolyte. Preoperative Hct was 44.8.    Past Medical History:   Diagnosis Date    KRISTY (generalized anxiety disorder) 12/11/2018    Scoliosis        History reviewed. No pertinent surgical history.    Review of patient's allergies indicates:  No Known Allergies    Family History     Problem Relation (Age of Onset)    Irritable bowel syndrome Mother    Miscarriages / Stillbirths Mother    No Known Problems Father          Tobacco Use    Smoking status: Never Smoker    Smokeless tobacco: Never Used   Substance and Sexual Activity    Alcohol use: No    Drug use: No    Sexual activity: Never       Review of Systems   Constitutional: Negative for fever.   Genitourinary: Negative for decreased urine volume.   Musculoskeletal: Positive for back pain.   Skin: Positive for rash (birthmark on R forearm).   Psychiatric/Behavioral: The patient is nervous/anxious.        Objective:     Vital Signs Range (Last 24H):  Temp:  [96.7 °F (35.9 °C)-98.7 °F (37.1 °C)]   Pulse:  [76-97]   Resp:  [18]   BP: (107)/(64)   SpO2:  [100 %]   Arterial Line BP: (136)/(52)     I & O (Last 24H):    Intake/Output Summary (Last 24 hours) at 8/15/2019 1954  Last data filed at 8/15/2019 1935  Gross per 24 hour   Intake 7019 ml   Output 3580 ml   Net 3439 ml       Ventilator Data (Last 24H):          Hemodynamic Parameters  (Last 24H):       Physical Exam:  Physical Exam   Constitutional: She appears well-developed and well-nourished.   Crying from pain   HENT:   Head: Normocephalic and atraumatic.   Nose: Nose normal.   Mouth/Throat: Oropharynx is clear and moist.   Nasal canula in place   Eyes: Conjunctivae and EOM are normal. Right eye exhibits no discharge. Left eye exhibits no discharge. No scleral icterus.   Neck: Neck supple.   Cardiovascular: Normal rate, regular rhythm, normal heart sounds and intact distal pulses.   No murmur heard.  Pulmonary/Chest: Effort normal and breath sounds normal. No respiratory distress.   Abdominal: Soft. Bowel sounds are normal. She exhibits no distension. There is no tenderness.   Genitourinary:   Genitourinary Comments: Ryan in place   Musculoskeletal: Normal range of motion. She exhibits no edema or deformity.   Dressing in place overlying spine. Clean, dry, intact. Drain in place, blood in drain.    Neurological: No cranial nerve deficit. She exhibits normal muscle tone.   Still partially sedated, moving hands and legs spontaneously.   Not oriented to time or place, but does frequently ask for orientation to these things   Skin: Skin is warm and dry. Capillary refill takes less than 2 seconds. She is not diaphoretic.       Lines/Drains/Airways     Drain                 Closed/Suction Drain 08/15/19 1725 Right Back Accordion 15 Fr. less than 1 day         Urethral Catheter 08/15/19 1240 Double-lumen;Straight-tip 16 Fr. less than 1 day          Arterial Line                 Arterial Line 08/15/19 1251 Right Radial less than 1 day          Peripheral Intravenous Line                 Peripheral IV - Single Lumen 18 G Posterior;Right Hand -- days         Peripheral IV - Single Lumen 08/15/19 1256 16 G Left Hand less than 1 day                Laboratory (Last 24H):   Recent Results (from the past 24 hour(s))   Prepare RBC 2 Units; hold for surgery    Collection Time: 08/15/19  8:42 AM   Result  Value Ref Range    UNIT NUMBER [id number]     Product Code [id number]     DISPENSE STATUS CROSSMATCHED     CODING SYSTEM BSGD364     Unit Blood Type Code 5100     Unit Blood Type O POS     Unit Expiration [id number]     UNIT NUMBER [id number]     Product Code P2419O74     DISPENSE STATUS CROSSMATCHED     CODING SYSTEM AMDA022     Unit Blood Type Code 5100     Unit Blood Type O POS     Unit Expiration [id number]    Type And Screen Preop    Collection Time: 08/15/19  9:32 AM   Result Value Ref Range    Group & Rh O POS     Indirect Eli NEG        Chest X-Ray: none    Diagnostic Results:  No Further    Assessment/Plan:     Active Diagnoses:    Diagnosis Date Noted POA    PRINCIPAL PROBLEM:  Scoliosis [M41.9] 08/15/2019 Yes      Problems Resolved During this Admission:       17 yo female with PMH of KRISTY and AIS presents post- posterior spinal fusion from T4-L2. Still partially sedated upon arrival to the floor, endorses significant pain.     CNS:  - Pt still recovering from sedation.   - PT to see tomorrow    Pain/muscle spasm control:  - tylenol 325 mg q6h PO RTC  - gabapentin 400 mg PO TID  - methocarbamol 750 mg PO QID  - morphine PCA: 1 mg/h + 1 mg on demand q15min; lockout 5mg/h  - Valium 3mg PO q6h PRN spasm  - toradol 10 mg PO q8h PRN pain  - nasloxone 0.02 mg IV PRN rescue    CV:  - Low BP (MAP 50) for 15-20 min during procedure; no concerns at this time.  - Art line in place; will remove if MAPs appropriate x4h  - Continuous tele while in PICU    Pulm:  - JUDY upon arrival, extubated following procedure.  - ETCO2 monitor in place per PCA protocol  -  while in PICU    FEN/GI:  - F: D5NS + 20 KCl @100 ml/h   - E: BMP, Mg, Phos @ 12m tonight  - N: NPO for now, may progress to ice chips and then clears as mental status allows  - GI: No acute concerns    Renal:  - Ryan in place until up with PT  - Continue to follow I/O and BUN overnight    Heme:  - Significant blood loss during surgery, replaced  with colloid and crystalloid but not blood products  - CBC @ 12m tonight, Type and Screen up to date  - Will transfuse as needed  - SCDs in place    ID:  - s/p 2 g ancef x1  - Clinda 600 mg q8h until drain removed    Psych:  - Home lexapro 10 mg daily to start tomorrow AM      Stephany Viveros MD  Pediatric Critical Care  [facility]

## 2019-08-16 NOTE — NURSING
Awaiting new PCA syringe from pharmacy, respirations 8-12 encouraged to deep breathe, patient continues to breath shallow. Suggested that PCA be discontinued since patient going to floor. Discontinued, will administer PRN's.

## 2019-08-16 NOTE — TRANSFER OF CARE
"Anesthesia Transfer of Care Note    Patient: Columba Allison    Procedure(s) Performed: Procedure(s) (LRB):  FUSION, SPINE, WITH INSTRUMENTATION-OP (N/A)    Patient location: ICU    Anesthesia Type: general    Transport from OR: Transported from OR on 6-10 L/min O2 by face mask with adequate spontaneous ventilation. Continuous ECG monitoring in transport. Continuos invasive BP monitoring in transport. Continuous SpO2 monitoring in transport    Post pain: pain needs to be addressed    Post assessment: no apparent anesthetic complications    Post vital signs: stable    Level of consciousness: awake and alert    Nausea/Vomiting: no nausea/vomiting    Complications: none    Transfer of care protocol was followed      Last vitals:   Visit Vitals  /64 (BP Location: Right arm, Patient Position: Lying)   Pulse 97   Temp 35.9 °C (96.7 °F) (Axillary)   Resp 18   Ht 5' 4" (1.626 m)   Wt 52.2 kg (115 lb)   LMP 07/15/2019   SpO2 100%   Breastfeeding? No   BMI 19.74 kg/m²     "

## 2019-08-16 NOTE — PLAN OF CARE
08/16/19 1339   Discharge Assessment   Assessment Type Discharge Planning Assessment   Abigail Coates to obtain initial assessment

## 2019-08-16 NOTE — PROGRESS NOTES
[facility]  Pediatric Critical Care  Progress Note    Patient Name: Columba Allison  MRN: [id number]  Admission Date: 8/15/2019  Hospital Length of Stay: 1 days  Code Status: No Order   Attending Provider: Alexander Asif MD   Primary Care Physician: Neymar Chirinos MD    Subjective:     HPI: The patient is a 18 y.o. female with PMH of generalized anxiety disorder and adolescent idiopathic scoliosis who presents after posterior spine fusion T4-L2.      Pt experienced approximately 900 cc blood loss in the OR and received 1 L ablumin and 5.2 L Isolyte. Preoperative Hct was 44.8.    Interval history: No acute events overnight. Continued to have low respiratory drive. Pain well controlled.     Review of Systems  Objective:     Vital Signs Range (Last 24H):  Temp:  [97.6 °F (36.4 °C)-98.7 °F (37.1 °C)]   Pulse:  []   Resp:  [6-23]   BP: ()/(50-76)   SpO2:  [92 %-100 %]   Arterial Line BP: ()/(41-56)     I & O (Last 24H):    Intake/Output Summary (Last 24 hours) at 8/16/2019 0659  Last data filed at 8/16/2019 0600  Gross per 24 hour   Intake 8958.97 ml   Output 5270 ml   Net 3688.97 ml       Ventilator Data (Last 24H):          Hemodynamic Parameters (Last 24H):       Physical Exam:  Physical Exam   Constitutional: She is oriented to person, place, and time. She appears well-developed and well-nourished.   Sleeping comfortable in bed. Awakes for exam but easily falls back to sleep.   HENT:   Head: Normocephalic and atraumatic.   Nose: Nose normal.   Mouth/Throat: Oropharynx is clear and moist.   Eyes: Conjunctivae and EOM are normal. Right eye exhibits no discharge. Left eye exhibits no discharge. No scleral icterus.   Neck: Neck supple.   Cardiovascular: Normal rate, regular rhythm, normal heart sounds and intact distal pulses.   No murmur heard.  Pulmonary/Chest: Effort normal and breath sounds normal. No respiratory distress.   Abdominal: Soft. Bowel sounds are normal. She  exhibits no distension. There is no tenderness.   Genitourinary:   Genitourinary Comments: Ryan in place   Musculoskeletal: Normal range of motion. She exhibits no edema or deformity.   Dressing in place overlying spine. Clean, dry, intact. Drain in place, blood in drain.    Neurological: She is alert and oriented to person, place, and time. She exhibits normal muscle tone.   Moves all extremities   Skin: Skin is warm and dry. Capillary refill takes less than 2 seconds. She is not diaphoretic.   Psychiatric:   Appropriate mood/behavior/judgement   Nursing note and vitals reviewed.      Lines/Drains/Airways     Drain                 Closed/Suction Drain 08/15/19 1725 Right Back Accordion 15 Fr. less than 1 day         Urethral Catheter 08/15/19 1240 Double-lumen;Straight-tip 16 Fr. less than 1 day          Peripheral Intravenous Line                 Peripheral IV - Single Lumen 18 G Posterior;Right Hand -- days         Peripheral IV - Single Lumen 08/15/19 1256 16 G Left Hand less than 1 day                Laboratory (Last 24H):   Recent Results (from the past 24 hour(s))   Prepare RBC 2 Units; hold for surgery    Collection Time: 08/15/19  8:42 AM   Result Value Ref Range    UNIT NUMBER [id number]     Product Code N1470P68     DISPENSE STATUS CROSSMATCHED     CODING SYSTEM [id number]     Unit Blood Type Code 5100     Unit Blood Type O POS     Unit Expiration [id number]     UNIT NUMBER [id number]     Product Code X7663F17     DISPENSE STATUS CROSSMATCHED     CODING SYSTEM [id number]     Unit Blood Type Code 5100     Unit Blood Type O POS     Unit Expiration [id number]    Type And Screen Preop    Collection Time: 08/15/19  9:32 AM   Result Value Ref Range    Group & Rh O POS     Indirect Eli NEG    ISTAT PROCEDURE    Collection Time: 08/15/19  2:53 PM   Result Value Ref Range    POC PH 7.387 7.35 - 7.45    POC PCO2 38.2 35 - 45 mmHg    POC PO2 271 (H) 80 - 100 mmHg    POC HCO3 22.9 (L) 24 - 28 mmol/L    POC BE  -2 -2 to 2 mmol/L    POC SATURATED O2 100 95 - 100 %    POC Glucose 93 70 - 110 mg/dL    POC Sodium 142 136 - 145 mmol/L    POC Potassium 3.4 (L) 3.5 - 5.1 mmol/L    POC TCO2 24 23 - 27 mmol/L    POC Ionized Calcium 1.02 (L) 1.06 - 1.42 mmol/L    POC Hematocrit 32 (L) 36 - 54 %PCV    Sample ARTERIAL    ISTAT PROCEDURE    Collection Time: 08/15/19  4:19 PM   Result Value Ref Range    POC PH 7.398 7.35 - 7.45    POC PCO2 33.3 (L) 35 - 45 mmHg    POC PO2 253 (H) 80 - 100 mmHg    POC HCO3 20.5 (L) 24 - 28 mmol/L    POC BE -4 -2 to 2 mmol/L    POC SATURATED O2 100 95 - 100 %    POC Glucose 80 70 - 110 mg/dL    POC Sodium 148 (H) 136 - 145 mmol/L    POC Potassium 3.3 (L) 3.5 - 5.1 mmol/L    POC TCO2 21 (L) 23 - 27 mmol/L    POC Ionized Calcium 0.92 (L) 1.06 - 1.42 mmol/L    POC Hematocrit 26 (L) 36 - 54 %PCV    Sample ARTERIAL    ISTAT PROCEDURE    Collection Time: 08/15/19  6:08 PM   Result Value Ref Range    POC PH 7.244 (LL) 7.35 - 7.45    POC PCO2 57.7 (HH) 35 - 45 mmHg    POC PO2 541 (H) 80 - 100 mmHg    POC HCO3 25.0 24 - 28 mmol/L    POC BE -2 -2 to 2 mmol/L    POC SATURATED O2 100 95 - 100 %    POC Glucose 78 70 - 110 mg/dL    POC Sodium 148 (H) 136 - 145 mmol/L    POC Potassium 3.7 3.5 - 5.1 mmol/L    POC TCO2 27 23 - 27 mmol/L    POC Ionized Calcium 1.09 1.06 - 1.42 mmol/L    POC Hematocrit 23 (L) 36 - 54 %PCV    Sample ARTERIAL    CBC auto differential    Collection Time: 08/16/19 12:07 AM   Result Value Ref Range    WBC 9.38 3.90 - 12.70 K/uL    RBC 2.78 (L) 4.00 - 5.40 M/uL    Hemoglobin 8.4 (L) 12.0 - 16.0 g/dL    Hematocrit 26.7 (L) 37.0 - 48.5 %    Mean Corpuscular Volume 96 82 - 98 fL    Mean Corpuscular Hemoglobin 30.2 27.0 - 31.0 pg    Mean Corpuscular Hemoglobin Conc 31.5 (L) 32.0 - 36.0 g/dL    RDW 13.0 11.5 - 14.5 %    Platelets 218 150 - 350 K/uL    MPV 10.5 9.2 - 12.9 fL    Immature Granulocytes 0.4 0.0 - 0.5 %    Gran # (ANC) 7.6 1.8 - 7.7 K/uL    Immature Grans (Abs) 0.04 0.00 - 0.04 K/uL     Lymph # 1.2 1.0 - 4.8 K/uL    Mono # 0.5 0.3 - 1.0 K/uL    Eos # 0.0 0.0 - 0.5 K/uL    Baso # 0.02 0.00 - 0.20 K/uL    nRBC 0 0 /100 WBC    Gran% 81.4 (H) 38.0 - 73.0 %    Lymph% 12.7 (L) 18.0 - 48.0 %    Mono% 5.1 4.0 - 15.0 %    Eosinophil% 0.2 0.0 - 8.0 %    Basophil% 0.2 0.0 - 1.9 %    Differential Method Automated    Basic metabolic panel    Collection Time: 08/16/19 12:07 AM   Result Value Ref Range    Sodium 141 136 - 145 mmol/L    Potassium 4.9 3.5 - 5.1 mmol/L    Chloride 114 (H) 95 - 110 mmol/L    CO2 22 (L) 23 - 29 mmol/L    Glucose 111 (H) 70 - 110 mg/dL    BUN, Bld 5 (L) 6 - 20 mg/dL    Creatinine 0.6 0.5 - 1.4 mg/dL    Calcium 8.0 (L) 8.7 - 10.5 mg/dL    Anion Gap 5 (L) 8 - 16 mmol/L    eGFR if African American >60.0 >60 mL/min/1.73 m^2    eGFR if non African American >60.0 >60 mL/min/1.73 m^2   Magnesium    Collection Time: 08/16/19 12:07 AM   Result Value Ref Range    Magnesium 1.9 1.6 - 2.6 mg/dL   Phosphorus    Collection Time: 08/16/19 12:07 AM   Result Value Ref Range    Phosphorus 3.7 2.7 - 4.5 mg/dL         Chest X-Ray: None    Diagnostic Results: None    Assessment/Plan:     Active Diagnoses:    Diagnosis Date Noted POA    PRINCIPAL PROBLEM:  Scoliosis [M41.9] 08/15/2019 Yes      Problems Resolved During this Admission:     Grecia is a 19 yo female with PMH of KRISTY and AIS who is POD#1 post- posterior spinal fusion from T4-L2.      CNS:  - PT to see tomorrow     Pain/muscle spasm control:  - tylenol 325 mg q6h PO RTC  - gabapentin 300 mg PO TID  - oxycontin 10mg, PO, Q12H  - methocarbamol 750 mg PO QID  - discontinued morphine PCA: 1 mg/h + 1 mg on demand q15min; lockout 5mg/h  - Valium 3mg PO q6h PRN spasm  - toradol 10 mg PO q8h PRN pain  - naloxone 0.02 mg IV PRN rescue     CV: HDS  - Cardiac monitoring while in PICU     Pulm:  - JUDY upon arrival, extubated following procedure.  - Continuous pulse ox while in PICU     FEN/GI:  - F: None  - E: Continue to monitor and correct as needed  - N:  Adult Regular Diet  - GI: Miralax bowel regimen     Renal:  - Discontinue Ryan  - Strict I/O   - Trend BUN/Cr PRN     Heme:  - Significant blood loss during surgery, replaced with colloid and crystalloid but not blood products  - CBC overnight reassuring  - Trend CBC PRN  - SCDs in place     ID:  - s/p 2 g ancef x1  - Clinda 600 mg q8h until drain removed     Psych: Diagnosis of KRISTY prior to admission.  - Home lexapro 10 mg daily    Social: Mom at bedside. Update and in agreement with plan of care.     Dispo: To the floor ortho service today.    Patient seen and discussed with my attending, Dr Savannah Adan.    Lily Prince MD  Pediatric Critical Care  [facility]

## 2019-08-16 NOTE — PLAN OF CARE
08/16/19 1554   Discharge Assessment   Assessment Type Discharge Planning Assessment   Confirmed/corrected address and phone number on facesheet? Yes   Assessment information obtained from? Caregiver   Expected Length of Stay (days) 4   Communicated expected length of stay with patient/caregiver yes   Prior to hospitilization cognitive status: Alert/Oriented   Prior to hospitalization functional status: Independent;Adolescent   Current cognitive status: Alert/Oriented   Current Functional Status: Adolescent   Lives With parent(s);sibling(s)   Able to Return to Prior Arrangements yes   Is patient able to care for self after discharge? Patient is of pediatric age   Who are your caregiver(s) and their phone number(s)? mother: Erica Allison [phone]   Patient's perception of discharge disposition admitted as an inpatient   Readmission Within the Last 30 Days no previous admission in last 30 days   Patient currently being followed by outpatient case management? No   Patient currently receives any other outside agency services? No   Equipment Currently Used at Home none   Do you have any problems affording any of your prescribed medications? No   Is the patient taking medications as prescribed? yes   Does the patient have transportation home? Yes   Transportation Anticipated family or friend will provide   Does the patient receive services at the [facility]? No   Discharge Plan A Home with family   Discharge Plan B Home with family   DME Needed Upon Discharge  none   Patient/Family in Agreement with Plan yes   Pt admitted for PSF, doing well, on peds floor today. Pt lives with her mother, stepfather, 2 siblings. Pt has + ride home for dc and has [employer] for insurance. No dc needs anticipated. Will follow.

## 2019-08-16 NOTE — PLAN OF CARE
Problem: Adult Inpatient Plan of Care  Goal: Plan of Care Review  Columba Allison is a 18 y.o. female admitted to [facility] [place] [place] on 8/15/19 for Scoliosis, underwent T4-L2 PSF. Columba Allison tolerated evaluation well today. Pre-medicated for session, pain consistently 8/10 throughout session but seems increased with sitting up at EOB compared to lying down or standing. Ambulates 220 ft around PICU (1 loop) with R hand-held assist of therapist + contact-guard at L hip for safety. Does have c/o occasional dizziness with activity but never needed seated rest break with activity. Educated on spinal precautions (avoiding bending, lifting, twisting), mom verbalized understanding but patient needs further education. Ordered bedside commode for patient to utilize; will also need bedside commode for home since toilets are low per mom. Discussed PT role, POC, goals and recommendations (home with family; bedside commode) with patient and/or family; verbalized understanding. Columba Allison would benefit from acute PT services to promote mobility during this admission and improve return to PLOF.    **Pt is safe to transfer and ambulate within room with nursing or mom's hand-held assistance. Ok to use bedside commode next to bed for today (Friday) but will advance to placing frame of commode over existing toilet on Saturday**    Dmitri Curtis, PT  8/16/2019

## 2019-08-17 LAB
BASOPHILS # BLD AUTO: 0.02 K/UL (ref 0–0.2)
BASOPHILS NFR BLD: 0.2 % (ref 0–1.9)
DIFFERENTIAL METHOD: ABNORMAL
EOSINOPHIL # BLD AUTO: 0.1 K/UL (ref 0–0.5)
EOSINOPHIL NFR BLD: 0.9 % (ref 0–8)
ERYTHROCYTE [DISTWIDTH] IN BLOOD BY AUTOMATED COUNT: 12.9 % (ref 11.5–14.5)
HCT VFR BLD AUTO: 28.9 % (ref 37–48.5)
HGB BLD-MCNC: 9.1 G/DL (ref 12–16)
IMM GRANULOCYTES # BLD AUTO: 0.05 K/UL (ref 0–0.04)
IMM GRANULOCYTES NFR BLD AUTO: 0.4 % (ref 0–0.5)
LYMPHOCYTES # BLD AUTO: 1.2 K/UL (ref 1–4.8)
LYMPHOCYTES NFR BLD: 10 % (ref 18–48)
MCH RBC QN AUTO: 30.8 PG (ref 27–31)
MCHC RBC AUTO-ENTMCNC: 31.5 G/DL (ref 32–36)
MCV RBC AUTO: 98 FL (ref 82–98)
MONOCYTES # BLD AUTO: 0.7 K/UL (ref 0.3–1)
MONOCYTES NFR BLD: 6 % (ref 4–15)
NEUTROPHILS # BLD AUTO: 9.5 K/UL (ref 1.8–7.7)
NEUTROPHILS NFR BLD: 82.5 % (ref 38–73)
NRBC BLD-RTO: 0 /100 WBC
PLATELET # BLD AUTO: 188 K/UL (ref 150–350)
PMV BLD AUTO: 8.9 FL (ref 9.2–12.9)
RBC # BLD AUTO: 2.95 M/UL (ref 4–5.4)
WBC # BLD AUTO: 11.48 K/UL (ref 3.9–12.7)

## 2019-08-17 PROCEDURE — 85025 COMPLETE CBC W/AUTO DIFF WBC: CPT

## 2019-08-17 PROCEDURE — 36415 COLL VENOUS BLD VENIPUNCTURE: CPT

## 2019-08-17 PROCEDURE — 25000003 PHARM REV CODE 250: Performed by: STUDENT IN AN ORGANIZED HEALTH CARE EDUCATION/TRAINING PROGRAM

## 2019-08-17 PROCEDURE — 97165 OT EVAL LOW COMPLEX 30 MIN: CPT

## 2019-08-17 PROCEDURE — 25000003 PHARM REV CODE 250: Performed by: PEDIATRICS

## 2019-08-17 PROCEDURE — S0077 INJECTION, CLINDAMYCIN PHOSP: HCPCS | Performed by: STUDENT IN AN ORGANIZED HEALTH CARE EDUCATION/TRAINING PROGRAM

## 2019-08-17 PROCEDURE — S0028 INJECTION, FAMOTIDINE, 20 MG: HCPCS | Performed by: PEDIATRICS

## 2019-08-17 PROCEDURE — 97116 GAIT TRAINING THERAPY: CPT

## 2019-08-17 PROCEDURE — 11300000 HC PEDIATRIC PRIVATE ROOM

## 2019-08-17 PROCEDURE — 97535 SELF CARE MNGMENT TRAINING: CPT

## 2019-08-17 RX ORDER — HYDROCODONE BITARTRATE AND ACETAMINOPHEN 7.5; 325 MG/1; MG/1
1 TABLET ORAL EVERY 4 HOURS PRN
Status: DISCONTINUED | OUTPATIENT
Start: 2019-08-17 | End: 2019-08-18 | Stop reason: HOSPADM

## 2019-08-17 RX ORDER — CLINDAMYCIN HYDROCHLORIDE 150 MG/1
600 CAPSULE ORAL
Status: DISCONTINUED | OUTPATIENT
Start: 2019-08-18 | End: 2019-08-18

## 2019-08-17 RX ORDER — CYCLOBENZAPRINE HCL 5 MG
5 TABLET ORAL 3 TIMES DAILY
Status: DISCONTINUED | OUTPATIENT
Start: 2019-08-17 | End: 2019-08-18 | Stop reason: HOSPADM

## 2019-08-17 RX ORDER — MORPHINE SULFATE 2 MG/ML
2 INJECTION, SOLUTION INTRAMUSCULAR; INTRAVENOUS EVERY 4 HOURS PRN
Status: DISCONTINUED | OUTPATIENT
Start: 2019-08-17 | End: 2019-08-18 | Stop reason: HOSPADM

## 2019-08-17 RX ORDER — DIAZEPAM 5 MG/1
5 TABLET ORAL EVERY 6 HOURS PRN
Status: DISCONTINUED | OUTPATIENT
Start: 2019-08-17 | End: 2019-08-18 | Stop reason: HOSPADM

## 2019-08-17 RX ORDER — KETOROLAC TROMETHAMINE 10 MG/1
10 TABLET, FILM COATED ORAL EVERY 8 HOURS
Status: DISCONTINUED | OUTPATIENT
Start: 2019-08-17 | End: 2019-08-18 | Stop reason: HOSPADM

## 2019-08-17 RX ORDER — HYDROCODONE BITARTRATE AND ACETAMINOPHEN 7.5; 325 MG/1; MG/1
1 TABLET ORAL
Status: DISCONTINUED | OUTPATIENT
Start: 2019-08-17 | End: 2019-08-17

## 2019-08-17 RX ADMIN — ACETAMINOPHEN 499.2 MG: 160 SUSPENSION ORAL at 12:08

## 2019-08-17 RX ADMIN — HYDROCODONE BITARTRATE AND ACETAMINOPHEN 1 TABLET: 7.5; 325 TABLET ORAL at 12:08

## 2019-08-17 RX ADMIN — METHOCARBAMOL TABLETS 750 MG: 750 TABLET, COATED ORAL at 01:08

## 2019-08-17 RX ADMIN — CLINDAMYCIN IN 5 PERCENT DEXTROSE 600 MG: 12 INJECTION, SOLUTION INTRAVENOUS at 12:08

## 2019-08-17 RX ADMIN — POLYETHYLENE GLYCOL 3350 17 G: 17 POWDER, FOR SOLUTION ORAL at 08:08

## 2019-08-17 RX ADMIN — METHOCARBAMOL TABLETS 750 MG: 750 TABLET, COATED ORAL at 08:08

## 2019-08-17 RX ADMIN — KETOROLAC TROMETHAMINE 10 MG: 10 TABLET, FILM COATED ORAL at 01:08

## 2019-08-17 RX ADMIN — HYDROCODONE BITARTRATE AND ACETAMINOPHEN 1 TABLET: 7.5; 325 TABLET ORAL at 05:08

## 2019-08-17 RX ADMIN — MAGNESIUM HYDROXIDE 2400 MG: 400 SUSPENSION ORAL at 09:08

## 2019-08-17 RX ADMIN — ONDANSETRON 4 MG: 4 TABLET, ORALLY DISINTEGRATING ORAL at 12:08

## 2019-08-17 RX ADMIN — ESCITALOPRAM OXALATE 10 MG: 10 TABLET ORAL at 09:08

## 2019-08-17 RX ADMIN — OXYCODONE HYDROCHLORIDE 10 MG: 10 TABLET, FILM COATED, EXTENDED RELEASE ORAL at 08:08

## 2019-08-17 RX ADMIN — FAMOTIDINE 20 MG: 10 INJECTION, SOLUTION INTRAVENOUS at 08:08

## 2019-08-17 RX ADMIN — GABAPENTIN 300 MG: 300 CAPSULE ORAL at 08:08

## 2019-08-17 RX ADMIN — ACETAMINOPHEN 499.2 MG: 160 SUSPENSION ORAL at 06:08

## 2019-08-17 RX ADMIN — GABAPENTIN 300 MG: 300 CAPSULE ORAL at 03:08

## 2019-08-17 RX ADMIN — HYDROCODONE BITARTRATE AND ACETAMINOPHEN 1 TABLET: 7.5; 325 TABLET ORAL at 03:08

## 2019-08-17 RX ADMIN — MAGNESIUM HYDROXIDE 2400 MG: 400 SUSPENSION ORAL at 08:08

## 2019-08-17 RX ADMIN — CLINDAMYCIN IN 5 PERCENT DEXTROSE 600 MG: 12 INJECTION, SOLUTION INTRAVENOUS at 04:08

## 2019-08-17 RX ADMIN — ACETAMINOPHEN 499.2 MG: 160 SUSPENSION ORAL at 01:08

## 2019-08-17 RX ADMIN — OXYCODONE HYDROCHLORIDE 10 MG: 10 TABLET, FILM COATED, EXTENDED RELEASE ORAL at 09:08

## 2019-08-17 RX ADMIN — KETOROLAC TROMETHAMINE 10 MG: 10 TABLET, FILM COATED ORAL at 09:08

## 2019-08-17 RX ADMIN — GABAPENTIN 300 MG: 300 CAPSULE ORAL at 09:08

## 2019-08-17 RX ADMIN — CLINDAMYCIN IN 5 PERCENT DEXTROSE 600 MG: 12 INJECTION, SOLUTION INTRAVENOUS at 08:08

## 2019-08-17 RX ADMIN — CYCLOBENZAPRINE HYDROCHLORIDE 5 MG: 5 TABLET, FILM COATED ORAL at 09:08

## 2019-08-17 NOTE — PLAN OF CARE
Problem: Adult Inpatient Plan of Care  Goal: Plan of Care Review  Outcome: Ongoing (interventions implemented as appropriate)  VSS, pt in NAD, afebrile. PIVs clean, dry, and intact; flush well; saline locked. Island dressing on back clean, dry, and intact. Hemovac drain put out 30 ml serosanguineous drainage this shift. SCDs in place. Pt up, with assistance, twice this shift to urinate; no BM. Pt had one episode of emesis (600 ml of greenish emesis with food particles). Occurred as pt was trying to get up to use the bathroom and was in a lot of pain. Zofran given; no complaints since. Pt c/o pain throughout the shift. Now appears more comfortable, and states that her pain has decreased. Adjusted timing of meds so that hycet and tylenol will now be alternating. Dad at bedside; attentive to pt. POC reviewed with pt and pt's father. Questions answered; verbalized understanding. Safety maintained. Will continue to monitor.

## 2019-08-17 NOTE — PROGRESS NOTES
"[facility]  Orthopedics  Progress Note    Patient Name: Columba Allison  MRN: [id number]  Admission Date: 8/15/2019  Hospital Length of Stay: 2 days  Attending Provider: Alexander Asif MD  Primary Care Provider: Neymar Chirinos MD  Follow-up For: Procedure(s) (LRB):  FUSION, SPINE, WITH INSTRUMENTATION-OP (N/A)    Post-Operative Day: 2 Days Post-Op  Subjective:     Principal Problem:Scoliosis    Principal Orthopedic Problem: Post-Op Day 1; PLF T4-L2    Interval History: Patient examined at bedside. No acute events overnight. Continues to have moderate to severe pain. Controlled at present after pain medicine administered however was severe prior to this.     Review of patient's allergies indicates:  No Known Allergies    Current Facility-Administered Medications   Medication    acetaminophen 32 mg/mL liquid (PEDS) 499.2 mg    clindamycin 600 MG/50 ML D5W 600 mg/50 mL IVPB 600 mg    diazePAM 1 mg/mL oral solution 1 mg    diazePAM injection 1 mg    escitalopram oxalate tablet 10 mg    famotidine (PF) injection 20 mg    gabapentin capsule 300 mg    HYDROcodone-acetaminophen 7.5-325 mg per tablet 1 tablet    ketorolac tablet 10 mg    magnesium hydroxide 400 mg/5 ml suspension 2,400 mg    methocarbamol tablet 750 mg    naloxone 0.4 mg/mL injection 0.02 mg    ondansetron disintegrating tablet 4 mg    oxyCODONE 12 hr tablet 10 mg    polyethylene glycol packet 17 g     Objective:     Vital Signs (Most Recent):  Temp: 98.6 °F (37 °C) (08/17/19 0420)  Pulse: 90 (08/17/19 0420)  Resp: 18 (08/17/19 0420)  BP: (!) 82/49 (08/17/19 0420)  SpO2: 98 % (08/17/19 0420) Vital Signs (24h Range):  Temp:  [98.1 °F (36.7 °C)-98.6 °F (37 °C)] 98.6 °F (37 °C)  Pulse:  [] 90  Resp:  [12-18] 18  SpO2:  [95 %-100 %] 98 %  BP: ()/(49-64) 82/49     Weight: 52.2 kg (115 lb)  Height: 5' 4" (162.6 cm)  Body mass index is 19.74 kg/m².      Intake/Output Summary (Last 24 hours) at 8/17/2019 0729  Last " data filed at 8/17/2019 0600  Gross per 24 hour   Intake 1670 ml   Output 2085 ml   Net -415 ml       Ortho/SPM Exam     PE:  Gen:  No acute distress  CV:  Peripherally well-perfused.    Lungs:  Normal respiratory effort.  Head/Neck:  Normocephalic.  Atraumatic.     Spine:   Wound dressings c/d/i    BUE:  SILT M/U/R  Motor intact AIN/PIN/M/U/R   Cap refill < 2s  2+ RP    BLE:  SILT Sa/Hill/DP/SP/T  Motor intact EHL/FHL/TA/Gastroc  2+ DP, 2+ PT        Significant Labs:   CBC:   Recent Labs   Lab 08/15/19  1619 08/15/19  1808 08/16/19  0007   WBC  --   --  9.38   HGB  --   --  8.4*   HCT 26* 23* 26.7*   PLT  --   --  218     All pertinent labs within the past 24 hours have been reviewed.    Significant Imaging: I have reviewed and interpreted all pertinent imaging results/findings.    Assessment/Plan:     * Scoliosis  Pt is a 17 yo  F POD 2 s/p PSF    Pain control: multimodal  PT/OT: WBAT   DVT PPx: SCDs at all times when not ambulating  Abx: postop Clindamycin; d/c after drain is pulled  Labs: Hgb 8.4 yesterday, CBC ordered for this morning  Drain: 240 this morning d/c once below 30 cc output over 8 hour period  Ryan: d/c after mobilize with PT    Dispo: f/u PT recs; will consider d/c post-op day 2-3            Michel Chua MD  Orthopedics  [facility]  agree

## 2019-08-17 NOTE — SUBJECTIVE & OBJECTIVE
"Principal Problem:Scoliosis    Principal Orthopedic Problem: Post-Op Day 1; PLF T4-L2    Interval History: Patient examined at bedside. No acute events overnight. Continues to have moderate to severe pain. Controlled at present after pain medicine administered however was severe prior to this.     Review of patient's allergies indicates:  No Known Allergies    Current Facility-Administered Medications   Medication    acetaminophen 32 mg/mL liquid (PEDS) 499.2 mg    clindamycin 600 MG/50 ML D5W 600 mg/50 mL IVPB 600 mg    diazePAM 1 mg/mL oral solution 1 mg    diazePAM injection 1 mg    escitalopram oxalate tablet 10 mg    famotidine (PF) injection 20 mg    gabapentin capsule 300 mg    HYDROcodone-acetaminophen 7.5-325 mg per tablet 1 tablet    ketorolac tablet 10 mg    magnesium hydroxide 400 mg/5 ml suspension 2,400 mg    methocarbamol tablet 750 mg    naloxone 0.4 mg/mL injection 0.02 mg    ondansetron disintegrating tablet 4 mg    oxyCODONE 12 hr tablet 10 mg    polyethylene glycol packet 17 g     Objective:     Vital Signs (Most Recent):  Temp: 98.6 °F (37 °C) (08/17/19 0420)  Pulse: 90 (08/17/19 0420)  Resp: 18 (08/17/19 0420)  BP: (!) 82/49 (08/17/19 0420)  SpO2: 98 % (08/17/19 0420) Vital Signs (24h Range):  Temp:  [98.1 °F (36.7 °C)-98.6 °F (37 °C)] 98.6 °F (37 °C)  Pulse:  [] 90  Resp:  [12-18] 18  SpO2:  [95 %-100 %] 98 %  BP: ()/(49-64) 82/49     Weight: 52.2 kg (115 lb)  Height: 5' 4" (162.6 cm)  Body mass index is 19.74 kg/m².      Intake/Output Summary (Last 24 hours) at 8/17/2019 0729  Last data filed at 8/17/2019 0600  Gross per 24 hour   Intake 1670 ml   Output 2085 ml   Net -415 ml       Ortho/SPM Exam     PE:  Gen:  No acute distress  CV:  Peripherally well-perfused.    Lungs:  Normal respiratory effort.  Head/Neck:  Normocephalic.  Atraumatic.     Spine:   Wound dressings c/d/i    BUE:  SILT M/U/R  Motor intact AIN/PIN/M/U/R   Cap refill < 2s  2+ RP    BLE:  SILT " Sa/Hill/DP/SP/T  Motor intact EHL/FHL/TA/Gastroc  2+ DP, 2+ PT        Significant Labs:   CBC:   Recent Labs   Lab 08/15/19  1619 08/15/19  1808 08/16/19  0007   WBC  --   --  9.38   HGB  --   --  8.4*   HCT 26* 23* 26.7*   PLT  --   --  218     All pertinent labs within the past 24 hours have been reviewed.    Significant Imaging: I have reviewed and interpreted all pertinent imaging results/findings.

## 2019-08-17 NOTE — PT/OT/SLP PROGRESS
Physical Therapy  Treatment    Columba Allison   [id number]    Time Tracking:     PT Received On: 08/17/19   PT Start Time: 1330   PT Stop Time: 1345   PT Total Time (min): 15 min    Billable Minutes: Gait Training 11 and Therapeutic Activity 4      Recommendations:     Discharge recommendations: Home with family     Equipment recommendations: Bedside Commode    Barriers to Discharge: None    Patient Information:     Recent Surgery: s/p T4-L2 PSF on 8/15/19    Diagnosis: Scoliosis    General Precautions: Standard, fall, WBAT, no brace  Orthopedic Precautions: Spinal precautions (avoid bending, lifting, twisting)    Assessment:     Columba Allison tolerated treatment well today. She had been pre-medicated for pain prior to session, states 6/10 throughout her back, typically worse in chair or static standing (feels better when walking or lying down). She's able to ambulate 400 ft in hallways with R hand-held assist (contact-guard for comfort); endorses feeling dizzy occasionally, explained this is normal with losing blood martin-op. She verbalizes understanding of spinal precautions (avoid bending, lifting, twisting of spine). Discussed PT role, POC, goals and recommendations (home with family; BS commode) with patient and family; verbalized understanding. Columba Allison will continue to benefit from acute PT services to promote mobility during this admission and improve return to PLOF.    Problem List: weakness, impaired self-care skills, impaired mobility, decreased sitting or standing balance, gait instability, spinal precautions and pain    Rehab Prognosis: Good; patient would benefit from acute skilled PT services to address these deficits and reach maximum level of function.    Plan:     Patient to be seen daily to address the above listed problems via gait training, therapeutic activities, therapeutic exercises    Plan of Care Expires: 09/15/19  Plan of Care reviewed with: patient, mother    Subjective:      Communicated with RN prior to treatment, appropriate to see for treatment.    Pt found resting in L sidelying with HOB elevated upon PT entry to room, agreeable to treatment.    Does this patient have any cultural, spiritual, Anglican conflicts given the current situation? Patient/family has no barriers to learning. Patient/family verbalizes understanding of his/her program and goals and demonstrates them correctly. No cultural, spiritual, or educational needs identified.    Objective:     Patient found with: hemovac    Pain:  Pain Rating 1: 6/10  Pain Rating Post-Intervention 1: 6/10    Functional Mobility:    · Bed Mobility:  · Supine to Sitting: SBA via L sidelying;  HOB elevated  · Sitting to Supine: CGA via L sidelying; HOB elevated    · Scooting towards EOB in sitting: Supervision    · Transfers:  · Sit to Stand: CGA from EOB with R HHA x 1 trial(s)  · Stand to Sit: SBA to EOB with no AD x 1 trial(s)    · Gait:  · 400 feet in hallways with R hand-held assist (contact-guard for comfort); endorses feeling dizzy occasionally, explained this is normal with losing blood martin-op. Ambulates with minimally flexed knees and decreased gait speed    · Assist level: Contact-Guard Assist  · Device: Hand-held assist x 1    · Balance:  · Static Sit: Independent at EOB  · Static Stand: Stand-By Assist with no AD    Additional Therapeutic Activity/Exercises:     1. Discussed PT role, POC, goals and recommendations with patient and/or family; verbalized understanding.    2. Whiteboard was updated.    AM-PAC 6 CLICK MOBILITY  Turning over in bed (including adjusting bedclothes, sheets and blankets)?: 4  Sitting down on and standing up from a chair with arms (e.g., wheelchair, bedside commode, etc.): 3  Moving from lying on back to sitting on the side of the bed?: 4  Moving to and from a bed to a chair (including a wheelchair)?: 3  Need to walk in hospital room?: 3  Climbing 3-5 steps with a railing?: 3  Basic Mobility Total  Score: 20    Patient was left in L sidelying with HOB elevated with all lines intact, call button in reach and family and friends present.    GOALS:   Multidisciplinary Problems     Physical Therapy Goals        Problem: Physical Therapy Goal    Goal Priority Disciplines Outcome Goal Variances Interventions   Physical Therapy Goal     PT, PT/OT      Description:  Goals to be met by: 8/20/19      Pt will benefit from acute PT services to work towards the following goals:     1. Pt will demonstrate log rolling left or right with Stand-By Assist without cueing - Not met  2. Supine to sit with Stand-By Assist within spinal precautions with HOB < 30 deg - MET (8/17)  3. Sit to stand from appropriately-sized chair with Stand-By Assist within spinal precautions - Not met  4. Pt will ambulate 400 ft with Stand-By Assist - Not met  5. Patient and family will verbalize and demonstrate understanding of spinal precautions - Not met                  Dmitri Curtis, PT   8/17/2019

## 2019-08-17 NOTE — PT/OT/SLP EVAL
Occupational Therapy   Evaluation    Name: Columba Allison  MRN: [id number]  Admitting Diagnosis:  Scoliosis 2 Days Post-Op    Recommendations:     Discharge Recommendations: home  Discharge Equipment Recommendations:  commode  Barriers to discharge:  None    Assessment:     Columba Allison is a 18 y.o. female with a medical diagnosis of Scoliosis.  She presents with new onset of spinal precautions, post op pain and decline in independence with ADLs. Performance deficits affecting function: pain, orthopedic precautions.      Rehab Prognosis: Good; patient would benefit from acute skilled OT services to address these deficits and reach maximum level of function.       Plan:     Patient to be seen 2 x/week to address the above listed problems via self-care/home management, therapeutic activities, therapeutic exercises, neuromuscular re-education  · Plan of Care Expires:  9/17/2019  · Plan of Care Reviewed with: patient    Subjective     Chief Complaint: Sharp stabbing pain in mid thoracic region.  Patient/Family Comments/goals: Pt agreeable to progressing towards independence with ADLs and functional mobility.     Occupational Profile:  Living Environment: Pt splits time between living with her mom and dad at their respective homes at baseline. Reports upon discharge she will be staying at her father's house which is a 1 SH with 0 JORDY. Mom's house has flight of stairs to ascend/descend.  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  Patient found right sidelying with hemovac upon OT entry to room.    General Precautions: Standard, fall   Orthopedic Precautions:spinal precautions   Braces: N/A     Occupational Performance:    Bed Mobility:    · Patient completed Rolling/Turning to Left with  minimum assistance  · Patient completed Scooting/Bridging with minimum assistance  · Patient completed Supine to Sit with minimum assistance    Functional Mobility/Transfers:  · Patient completed Sit <> Stand Transfer with stand by assistance  with  no assistive device   · Patient completed Bed <> Chair Transfer using Step Transfer technique with stand by assistance with no assistive device  · Functional Mobility: Pt ambulated within room for ADLs, needed to sit due to stabbing pain vs spasm.     Activities of Daily Living:  · Feeding:  independence    · Grooming: stand by assistance    · Bathing: DNT     · Upper Body Dressing: stand by assistance (pt educated on compensatory dressing strategy)  · Lower Body Dressing: stand by assistance (pt educated on compensatory dressing strategy)  · Toileting: DNT, pt has been able to use BSC with min A.  Placed BSC over toilet in her room to encourage ambulation      Cognitive/Visual Perceptual:  Cognitive/Psychosocial Skills:     -       Follows Commands/attention:Follows two-step commands  -       Mood/Affect/Coping skills/emotional control: Appropriate to situation and Cooperative  Visual/Perceptual:  -Intact      Physical Exam:  Balance:    -       good   Upper Extremity Range of Motion:     -       Right Upper Extremity: WFL  -       Left Upper Extremity: WFL  Upper Extremity Strength:    -       Right Upper Extremity: WFL  -       Left Upper Extremity: WFL  Neurological:    -       intact    AMPAC 6 Click ADL:  AMPAC Total Score: 22    Treatment & Education:  · Pt educated on role of  OT in acute care setting.   · Assisted with ADLs and functional mobility with assist levels noted above  · Went into detail and demonstration re: spinal precautions and common ADLs that we normally twist such as reaching down to take off opposite side sock, reaching back for toilet paper while sitting on toilet, getting OOB, wiping after toileting and putting on LB clothing. Proposed alternative strategies in each situation through demonstration. Reviewed spinal precautions with patient (no bending, no lifting or twisting)  · Educated on and practiced log rolling for bed mobility.  Education:    Patient left supine with all lines intact    GOALS:   Multidisciplinary Problems     Occupational Therapy Goals        Problem: Occupational Therapy Goal    Goal Priority Disciplines Outcome Interventions   Occupational Therapy Goal     OT, PT/OT Ongoing (interventions implemented as appropriate)    Description:  Goals to be met by: 8/17/2018    Patient will increase functional independence with ADLs by performing:    Toileting from bedside commode (over toilet) with [place] for hygiene and clothing management.   Bathing from  standing at sink with Minimal Assistance.                      History:     Past Medical History:   Diagnosis Date    KRISTY (generalized anxiety disorder) 12/11/2018    Scoliosis        History reviewed. No pertinent surgical history.    Time Tracking:     OT Date of Treatment: 08/17/19  OT Start Time: 0958  OT Stop Time: 1033  OT Total Time (min): 35 min    Billable Minutes:Evaluation 10  Self Care/Home Management 25    BRY Flaherty  8/17/2019

## 2019-08-17 NOTE — ASSESSMENT & PLAN NOTE
Pt is a 19 yo  F POD 2 s/p PSF    Pain control: multimodal  PT/OT: WBAT   DVT PPx: SCDs at all times when not ambulating  Abx: postop Clindamycin; d/c after drain is pulled  Labs: Hgb 8.4 yesterday, CBC ordered for this morning  Drain: 240 this morning d/c once below 30 cc output over 8 hour period  Ryan: d/c after mobilize with PT    Dispo: f/u PT recs; will consider d/c post-op day 2-3

## 2019-08-17 NOTE — PLAN OF CARE
Problem: Adult Inpatient Plan of Care  Goal: Plan of Care Review  Columba Allison tolerated treatment well today. She had been pre-medicated for pain prior to session, states 6/10 throughout her back, typically worse in chair or static standing (feels better when walking or lying down). She's able to ambulate 400 ft in hallways with R hand-held assist (contact-guard for comfort); endorses feeling dizzy occasionally, explained this is normal with losing blood martin-op. She verbalizes understanding of spinal precautions (avoid bending, lifting, twisting of spine). Discussed PT role, POC, goals and recommendations (home with family; BS commode) with patient and family; verbalized understanding. Columba Allison will continue to benefit from acute PT services to promote mobility during this admission and improve return to PLOF.    **Pt is safe to ambulate with family hand-held assistance within room or in hallways. Encouraged patient to ambulate with family 1 more time this evening, mom stated she would pass along for dad to assist with walking in evening**    Dmitri Curtis, PT  8/17/2019

## 2019-08-17 NOTE — PLAN OF CARE
Problem: Occupational Therapy Goal  Goal: Occupational Therapy Goal  Goals to be met by: 8/17/2018    Patient will increase functional independence with ADLs by performing:    Toileting from bedside commode (over toilet) with [place] for hygiene and clothing management.   Bathing from  standing at sink with Minimal Assistance.    Outcome: Ongoing (interventions implemented as appropriate)  Goals remain appropriate, continue with OT POC.    BRY Buck  8/17/2019  Rehab Services

## 2019-08-18 VITALS
SYSTOLIC BLOOD PRESSURE: 102 MMHG | WEIGHT: 115 LBS | RESPIRATION RATE: 20 BRPM | DIASTOLIC BLOOD PRESSURE: 55 MMHG | OXYGEN SATURATION: 98 % | BODY MASS INDEX: 19.63 KG/M2 | TEMPERATURE: 99 F | HEIGHT: 64 IN | HEART RATE: 91 BPM

## 2019-08-18 LAB
BLD PROD TYP BPU: NORMAL
BLD PROD TYP BPU: NORMAL
BLOOD UNIT EXPIRATION DATE: NORMAL
BLOOD UNIT EXPIRATION DATE: NORMAL
BLOOD UNIT TYPE CODE: 5100
BLOOD UNIT TYPE CODE: 5100
BLOOD UNIT TYPE: NORMAL
BLOOD UNIT TYPE: NORMAL
CODING SYSTEM: NORMAL
CODING SYSTEM: NORMAL
DISPENSE STATUS: NORMAL
DISPENSE STATUS: NORMAL
TRANS ERYTHROCYTES VOL PATIENT: NORMAL ML
TRANS ERYTHROCYTES VOL PATIENT: NORMAL ML

## 2019-08-18 PROCEDURE — 25000003 PHARM REV CODE 250: Performed by: STUDENT IN AN ORGANIZED HEALTH CARE EDUCATION/TRAINING PROGRAM

## 2019-08-18 PROCEDURE — 97535 SELF CARE MNGMENT TRAINING: CPT

## 2019-08-18 PROCEDURE — 97530 THERAPEUTIC ACTIVITIES: CPT

## 2019-08-18 PROCEDURE — 97116 GAIT TRAINING THERAPY: CPT

## 2019-08-18 RX ORDER — OXYCODONE HYDROCHLORIDE 5 MG/1
5 TABLET ORAL EVERY 4 HOURS PRN
Qty: 40 TABLET | Refills: 0 | Status: SHIPPED | OUTPATIENT
Start: 2019-08-18 | End: 2021-11-10

## 2019-08-18 RX ORDER — HYDROCODONE BITARTRATE AND ACETAMINOPHEN 7.5; 325 MG/1; MG/1
1 TABLET ORAL EVERY 6 HOURS PRN
Qty: 40 TABLET | Refills: 0 | Status: SHIPPED | OUTPATIENT
Start: 2019-08-18 | End: 2019-08-18 | Stop reason: SDUPTHER

## 2019-08-18 RX ORDER — CYCLOBENZAPRINE HCL 5 MG
5 TABLET ORAL 3 TIMES DAILY PRN
Qty: 30 TABLET | Refills: 0 | Status: SHIPPED | OUTPATIENT
Start: 2019-08-18 | End: 2019-08-28

## 2019-08-18 RX ORDER — HYDROCODONE BITARTRATE AND ACETAMINOPHEN 7.5; 325 MG/1; MG/1
1 TABLET ORAL EVERY 6 HOURS PRN
Qty: 40 TABLET | Refills: 0 | Status: SHIPPED | OUTPATIENT
Start: 2019-08-18 | End: 2021-11-10

## 2019-08-18 RX ORDER — OXYCODONE HCL 10 MG/1
TABLET, FILM COATED, EXTENDED RELEASE ORAL
Qty: 10 TABLET | Refills: 0 | Status: SHIPPED | OUTPATIENT
Start: 2019-08-18 | End: 2019-08-18 | Stop reason: HOSPADM

## 2019-08-18 RX ADMIN — CYCLOBENZAPRINE HYDROCHLORIDE 5 MG: 5 TABLET, FILM COATED ORAL at 09:08

## 2019-08-18 RX ADMIN — CLINDAMYCIN HYDROCHLORIDE 600 MG: 150 CAPSULE ORAL at 12:08

## 2019-08-18 RX ADMIN — POLYETHYLENE GLYCOL 3350 17 G: 17 POWDER, FOR SOLUTION ORAL at 09:08

## 2019-08-18 RX ADMIN — GABAPENTIN 300 MG: 300 CAPSULE ORAL at 09:08

## 2019-08-18 RX ADMIN — KETOROLAC TROMETHAMINE 10 MG: 10 TABLET, FILM COATED ORAL at 06:08

## 2019-08-18 RX ADMIN — MAGNESIUM HYDROXIDE 2400 MG: 400 SUSPENSION ORAL at 09:08

## 2019-08-18 RX ADMIN — HYDROCODONE BITARTRATE AND ACETAMINOPHEN 1 TABLET: 7.5; 325 TABLET ORAL at 04:08

## 2019-08-18 RX ADMIN — OXYCODONE HYDROCHLORIDE 10 MG: 10 TABLET, FILM COATED, EXTENDED RELEASE ORAL at 09:08

## 2019-08-18 RX ADMIN — CLINDAMYCIN HYDROCHLORIDE 600 MG: 150 CAPSULE ORAL at 07:08

## 2019-08-18 NOTE — PLAN OF CARE
Problem: Adult Inpatient Plan of Care  Goal: Plan of Care Review  Columba Allison tolerated treatment well today. States pain is improving each day, motivated to discharge home today. Bedside commode was delivered to room by DME this morning. Therapist set-up for her height at start of session. She is performing bed mobility independently, transferring independently, ambulates 400 ft in hallways with supervision. Focused bulk of session on education regarding encouraging UE AROM, cervical ROM, spinal precautions (avoiding bending, lifting, twisting), car transfers, how to progress mobility at home (I.e. Ambulate every 2 hours when awake, bed is for sleeping, out of bed when awake, start walking outside or at mall with family within next week). Discussed discharge from acute PT services with patient and/or family as patient is mobilizing well with family and/or nursing; verbalized understanding. Columba Allison has no further acute PT needs, will now discharge from acute PT services.    Dmitri Curtis, PT  8/18/2019

## 2019-08-18 NOTE — PT/OT/SLP PROGRESS
Physical Therapy  Treatment and Discharge    Columba Allison   [id number]    Time Tracking:     PT Received On: 08/18/19   PT Start Time: 1115   PT Stop Time: 1140   PT Total Time (min): 25 min    Billable Minutes: Gait Training 9 and Therapeutic Activity 16      Recommendations:     Discharge recommendations: Home with family     Equipment recommendations: Bedside Commode (already brought to room, fitted to her size today)    Barriers to Discharge: None    Patient Information:     Recent Surgery: s/p T4-L2 PSF on 8/15/19    Diagnosis: Scoliosis    General Precautions: Standard, fall, WBAT, no brace  Orthopedic Precautions: Spinal precautions (avoid bending, lifting, twisting)    Assessment:     Columba Allison tolerated treatment well today. States pain is improving each day, motivated to discharge home today. Bedside commode was delivered to room by DME this morning. Therapist set-up for her height at start of session. She is performing bed mobility independently, transferring independently, ambulates 400 ft in hallways with supervision. Focused bulk of session on education regarding encouraging UE AROM, cervical ROM, spinal precautions (avoiding bending, lifting, twisting), car transfers, how to progress mobility at home (I.e. Ambulate every 2 hours when awake, bed is for sleeping, out of bed when awake, start walking outside or at mall with family within next week). Discussed discharge from acute PT services with patient and/or family as patient is mobilizing well with family and/or nursing; verbalized understanding. Columba Allison has no further acute PT needs, will now discharge from acute PT services.    Problem List: pain and spinal precautions    Plan:     Discharge from acute PT services.    Plan of Care reviewed with: patient, mother    Subjective:     Communicated with RN prior to evaluation, appropriate to see for treatment.    Pt found resting in L sidelying in bed with mom present upon PT entry to  room, agreeable to treatment.    Does this patient have any cultural, spiritual, Orthodoxy conflicts given the current situation? Patient has no barriers to learning. Patient verbalizes understanding of his/her program and goals and demonstrates them correctly. No cultural, spiritual, or educational needs identified.    Objective:     Patient found with: (no lines)    Pain:  Pain Rating 1: 4/10  Pain Rating Post-Intervention 1: 4/10    Functional Mobility:    · Bed Mobility:  · Supine to Sitting: Independent  · Sitting to Supine: Independent  · Scooting towards EOB in sitting: Independent    · Transfers:  · Sit to Stand: Independent from EOB with no AD x 1 trial(s)  · Stand to Sit: Independent to EOB with no AD x 1 trial(s)    · Gait:  · 400 feet, pain well-controlled, no losses of balance or unsteadiness noted. Able to participate in free-flowing conversation while ambulation, no fatigue evident    · Assist level: Supervision  · Device: no AD    · Balance:  · Static Sit: Independent at EOB  · Static Stand: Supervision with no AD    Additional Therapeutic Activity/Exercises:     1. Focused bulk of session on education regarding:     A. Encouraging UE AROM (she has full overhead shoulder flexion on either side today)   B. Cervical ROM (able to rotate neck ~70 deg in either direction without pain)   C. Spinal precautions (avoiding bending, lifting, twisting)   D. Car transfers (push front passenger seat back for more leg room, adjust to more reclined position for ride home)   E. How to progress mobility at home (I.e. Ambulate every 2 hours when awake, bed is for sleeping, out of bed when awake, start walking outside or at mall with family within next week).    2. Discussed discharge from acute PT services with patient and/or family as patient is mobilizing well with family and/or nursing; verbalized understanding.    AM-PAC 6 CLICK MOBILITY  Turning over in bed (including adjusting bedclothes, sheets and blankets)?:  4  Sitting down on and standing up from a chair with arms (e.g., wheelchair, bedside commode, etc.): 4  Moving from lying on back to sitting on the side of the bed?: 4  Moving to and from a bed to a chair (including a wheelchair)?: 4  Need to walk in hospital room?: 4  Climbing 3-5 steps with a railing?: 4  Basic Mobility Total Score: 24    Patient was left resting in L sidelying in bed with all lines intact, call button in reach, RN notified and mom present.    GOALS:   Multidisciplinary Problems     Physical Therapy Goals        Problem: Physical Therapy Goal    Goal Priority Disciplines Outcome Goal Variances Interventions   Physical Therapy Goal     PT, PT/OT      Description:  Pt discharged from acute PT services on 8/18, see progress made towards goals below:     1. Pt will demonstrate log rolling left or right with Stand-By Assist without cueing - MET (8/18)  2. Supine to sit with Stand-By Assist within spinal precautions with HOB < 30 deg - MET (8/17)  3. Sit to stand from appropriately-sized chair with Stand-By Assist within spinal precautions - MET (8/18)  4. Pt will ambulate 400 ft with Stand-By Assist - MET (8/18)  5. Patient and family will verbalize and demonstrate understanding of spinal precautions - MET (8/18)                  Dmitri Curtis, PT   8/18/2019

## 2019-08-18 NOTE — PLAN OF CARE
Problem: Adult Inpatient Plan of Care  Goal: Plan of Care Review  Outcome: Ongoing (interventions implemented as appropriate)  Pt stable throughout shift. VSS, afebrile. No PIV. Meds given per order. PRN hydrocodone-acet given x1 for incisional pain of 6/10 directly over fusion site. Minimal relief noted. Pain mostly controlled w/ scheduled PO meds, pt stated pain normally spike in early mornign after she has slept in one position for so long.  [place] border dressing CDI w/ scant serosanguinous drainage. Pt moves freely albeit slowly w/ minimal to no assistance. Tolerating PO well. POC reviewed w/ pt & father, verbalized understanding, no questions at this time. Safety maintained, will continue to monitor.

## 2019-08-18 NOTE — PT/OT/SLP PROGRESS
Occupational Therapy   Treatment    Name: Columba Allison  MRN: [id number]  Admitting Diagnosis:  Scoliosis  3 Days Post-Op    Recommendations:     Discharge Recommendations: home  Discharge Equipment Recommendations:  none  Barriers to discharge:  None    Assessment:     Columba Allison is a 18 y.o. female with a medical diagnosis of Scoliosis.  She presents with good adaptation to spinal precautions and ADLs. Performance deficits affecting function are pain, orthopedic precautions.     Rehab Prognosis:  Good; patient would benefit from acute skilled OT services to address these deficits and reach maximum level of function.       Plan:     Patient to be seen 2 x/week to address the above listed problems via self-care/home management, therapeutic activities, therapeutic exercises, neuromuscular re-education  · Plan of Care Expires: 08/18/19  · Plan of Care Reviewed with: patient, mother    Subjective     Pain/Comfort:  · Pain Rating 1: 4/10  · Location - Orientation 1: generalized  · Location 1: back  · Pain Addressed 1: Reposition, Distraction  · Pain Rating Post-Intervention 1: 5/10  · Pain Addressed 2: Pre-medicate for activity    Objective:     Communicated with: RN prior to session.  Patient found right sidelying with (no lines) upon OT entry to room.    General Precautions: Standard, fall   Orthopedic Precautions:spinal precautions   Braces: N/A     Occupational Performance:     Bed Mobility:    · Patient completed Supine to Sit with independence     Functional Mobility/Transfers:  · Patient completed Sit <> Stand Transfer with independence  with  no assistive device   · Functional Mobility: Pt ambulated to/from restroom to perform ADLs.     Activities of Daily Living:  · Feeding:  independence    · Grooming: independence (for brushing hair) Demonstrated compensatory strategy to minimize both arms above head simultaneously which is painful for her.   · Bathing: independence (standing at sink)   · Upper Body  Dressing: independence    · Lower Body Dressing: independence    · Toileting: modified independence (uses raised toilet seat)    DME delivered (BSC) to room. Adjusted it to pt height. Demonstrated to family how to adjust as needed.     [facility] 6 Click ADL: 24    Treatment & Education:  · Pt educated on role of OT in acute care setting.   · Assisted with ADLs and functional mobility with assist levels noted above  · Pt is able to follow and recall spinal precautions.     Patient left up in chair with all lines intactEducation:      GOALS:   Multidisciplinary Problems     Occupational Therapy Goals     Not on file          Multidisciplinary Problems (Resolved)        Problem: Occupational Therapy Goal    Goal Priority Disciplines Outcome Interventions   Occupational Therapy Goal   (Resolved)     OT, PT/OT Outcome(s) achieved    Description:  Goals to be met by: 8/17/2018    Patient will increase functional independence with ADLs by performing:    Toileting from bedside commode (over toilet) with [place] for hygiene and clothing management. Goal met  Bathing from  standing at sink with Minimal Assistance. Goal met                       Time Tracking:     OT Date of Treatment: 08/18/19  OT Start Time: 0950  OT Stop Time: 1020  OT Total Time (min): 30 min    Billable Minutes:Self Care/Home Management 30    BRY Flaherty  8/18/2019

## 2019-08-18 NOTE — PLAN OF CARE
Problem: Occupational Therapy Goal  Goal: Occupational Therapy Goal  Goals to be met by: 8/17/2018    Patient will increase functional independence with ADLs by performing:    Toileting from bedside commode (over toilet) with [place] for hygiene and clothing management. Goal met  Bathing from  standing at sink with Minimal Assistance. Goal met     Outcome: Outcome(s) achieved Date Met: 08/18/19  All goals met.     BRY Buck  8/18/2019  Rehab Services

## 2019-08-18 NOTE — ANESTHESIA POSTPROCEDURE EVALUATION
Anesthesia Post Evaluation    Patient: Columba Allison    Procedure(s) Performed: Procedure(s) (LRB):  FUSION, SPINE, WITH INSTRUMENTATION-OP (N/A)    Final Anesthesia Type: general  Patient location during evaluation: ICU  Patient participation: Yes- Able to Participate  Level of consciousness: awake and alert  Post-procedure vital signs: reviewed and stable  Pain management: adequate  Airway patency: patent  PONV status at discharge: No PONV  Anesthetic complications: no      Cardiovascular status: blood pressure returned to baseline  Respiratory status: unassisted  Hydration status: euvolemic  Follow-up not needed.          Vitals Value Taken Time   /55 8/18/2019  9:13 AM   Temp 37.1 °C (98.7 °F) 8/18/2019  9:13 AM   Pulse 91 8/18/2019  9:13 AM   Resp 20 8/18/2019  9:13 AM   SpO2 98 % 8/18/2019  9:13 AM         No case tracking events are documented in the log.      Pain/Gopal Score: Presence of Pain: non-verbal indicators absent (8/18/2019  2:56 PM)  Pain Rating Prior to Med Admin: 6 (8/18/2019  9:52 AM)  Pain Rating Post Med Admin: 5 (8/17/2019  1:05 AM)

## 2019-08-18 NOTE — PLAN OF CARE
Problem: Adult Inpatient Plan of Care  Goal: Plan of Care Review  Patient stable throughout shift. VSS. Afebrile. Patient drinking and eating well. Appropriate uop noted. Patient had no complaints of nausea this shift. Patient upright in bed. Ambulated in room to bathroom with ot and in [place] with pt. 15mL from drain. Pain medications altered. PRN hydrocodone-aceteminophen given X1. All medications given as scheduled. Patient happy had friends visit. Spinal dressing CDI. Drain pulled this shift. Tolerated well. POC reviewed with patient mother and father. Verbalized understanding. Will continue to monitor.

## 2019-08-18 NOTE — PROGRESS NOTES
"[facility]  Orthopedics  Progress Note    Patient Name: Columba Allison  MRN: [id number]  Admission Date: 8/15/2019  Hospital Length of Stay: 3 days  Attending Provider: Alexander Asif MD  Primary Care Provider: Neymar Chirinos MD  Follow-up For: Procedure(s) (LRB):  FUSION, SPINE, WITH INSTRUMENTATION-OP (N/A)    Post-Operative Day: 3 Days Post-Op  Subjective:     Principal Problem:Scoliosis    Principal Orthopedic Problem: Post-Op Day 1; PLF T4-L2    Interval History: Patient examined at bedside. No acute events overnight. Pain much improved yesterday evening and this morning. Drain removed yesterday afternoon.    Review of patient's allergies indicates:  No Known Allergies    Current Facility-Administered Medications   Medication    clindamycin capsule 600 mg    cyclobenzaprine tablet 5 mg    diazePAM 1 mg/mL oral solution 1 mg    diazePAM injection 1 mg    diazePAM tablet 5 mg    escitalopram oxalate tablet 10 mg    gabapentin capsule 300 mg    HYDROcodone-acetaminophen 7.5-325 mg per tablet 1 tablet    ketorolac tablet 10 mg    magnesium hydroxide 400 mg/5 ml suspension 2,400 mg    morphine injection 2 mg    naloxone 0.4 mg/mL injection 0.02 mg    ondansetron disintegrating tablet 4 mg    oxyCODONE 12 hr tablet 10 mg    polyethylene glycol packet 17 g     Objective:     Vital Signs (Most Recent):  Temp: 98.9 °F (37.2 °C) (08/18/19 0429)  Pulse: 105 (08/18/19 0429)  Resp: 20 (08/18/19 0429)  BP: (!) 102/52 (08/18/19 0429)  SpO2: 95 % (08/18/19 0429) Vital Signs (24h Range):  Temp:  [98.2 °F (36.8 °C)-98.9 °F (37.2 °C)] 98.9 °F (37.2 °C)  Pulse:  [] 105  Resp:  [20] 20  SpO2:  [95 %-100 %] 95 %  BP: ()/(50-58) 102/52     Weight: 52.2 kg (115 lb)  Height: 5' 4" (162.6 cm)  Body mass index is 19.74 kg/m².      Intake/Output Summary (Last 24 hours) at 8/18/2019 0807  Last data filed at 8/18/2019 0600  Gross per 24 hour   Intake 1600 ml   Output --   Net 1600 ml "       Ortho/SPM Exam     PE:  Gen:  No acute distress  CV:  Peripherally well-perfused.    Lungs:  Normal respiratory effort.  Head/Neck:  Normocephalic.  Atraumatic.     Spine:   Wound dressings c/d/i    BUE:  SILT M/U/R  Motor intact AIN/PIN/M/U/R   Cap refill < 2s  2+ RP    BLE:  SILT Sa/Hill/DP/SP/T  Motor intact EHL/FHL/TA/Gastroc  2+ DP, 2+ PT        Significant Labs:   CBC:   Recent Labs   Lab 08/17/19  0756   WBC 11.48   HGB 9.1*   HCT 28.9*        All pertinent labs within the past 24 hours have been reviewed.    Significant Imaging: I have reviewed and interpreted all pertinent imaging results/findings.    Assessment/Plan:     * Scoliosis  Pt is a 17 yo  F POD 3 s/p PSF    Pain control: multimodal  PT/OT: WBAT   DVT PPx: SCDs at all times when not ambulating  Abx: DC post op clinda  Labs: Hgb stable on repeat check yesterday  Drain: removed at bedside  Ryan: out   Dispo: today. Home with family, likely early afternoon. Will go back by room to speak with father who was not at bedside during rounds this morning            Michel Chua MD  Orthopedics  [facility]  Seen simultaneously with resident and agree with above assessment and plan.

## 2019-08-18 NOTE — PLAN OF CARE
Problem: Adult Inpatient Plan of Care  Goal: Plan of Care Review  Patient stable throughout shift. VSS. Afebrile. No acute distress noted. No PRN pain medication given this shift. Patient ambulated to bathroom and in room. Good PO intake. Discharge instructions covered with patient and mother. Pain medication management covered with patient and mother. Verbalized understanding. Patient DC with home commode. Patient discharged home with help of transport.

## 2019-08-18 NOTE — ASSESSMENT & PLAN NOTE
Pt is a 19 yo  F POD 3 s/p PSF    Pain control: multimodal  PT/OT: WBAT   DVT PPx: SCDs at all times when not ambulating  Abx: DC post op clinda  Labs: Hgb stable on repeat check yesterday  Drain: removed at bedside  Ryan: out   Dispo: today. Home with family, likely early afternoon. Will go back by room to speak with father who was not at bedside during rounds this morning

## 2019-08-18 NOTE — SUBJECTIVE & OBJECTIVE
"Principal Problem:Scoliosis    Principal Orthopedic Problem: Post-Op Day 1; PLF T4-L2    Interval History: Patient examined at bedside. No acute events overnight. Pain much improved yesterday evening and this morning. Drain removed yesterday afternoon.    Review of patient's allergies indicates:  No Known Allergies    Current Facility-Administered Medications   Medication    clindamycin capsule 600 mg    cyclobenzaprine tablet 5 mg    diazePAM 1 mg/mL oral solution 1 mg    diazePAM injection 1 mg    diazePAM tablet 5 mg    escitalopram oxalate tablet 10 mg    gabapentin capsule 300 mg    HYDROcodone-acetaminophen 7.5-325 mg per tablet 1 tablet    ketorolac tablet 10 mg    magnesium hydroxide 400 mg/5 ml suspension 2,400 mg    morphine injection 2 mg    naloxone 0.4 mg/mL injection 0.02 mg    ondansetron disintegrating tablet 4 mg    oxyCODONE 12 hr tablet 10 mg    polyethylene glycol packet 17 g     Objective:     Vital Signs (Most Recent):  Temp: 98.9 °F (37.2 °C) (08/18/19 0429)  Pulse: 105 (08/18/19 0429)  Resp: 20 (08/18/19 0429)  BP: (!) 102/52 (08/18/19 0429)  SpO2: 95 % (08/18/19 0429) Vital Signs (24h Range):  Temp:  [98.2 °F (36.8 °C)-98.9 °F (37.2 °C)] 98.9 °F (37.2 °C)  Pulse:  [] 105  Resp:  [20] 20  SpO2:  [95 %-100 %] 95 %  BP: ()/(50-58) 102/52     Weight: 52.2 kg (115 lb)  Height: 5' 4" (162.6 cm)  Body mass index is 19.74 kg/m².      Intake/Output Summary (Last 24 hours) at 8/18/2019 0807  Last data filed at 8/18/2019 0600  Gross per 24 hour   Intake 1600 ml   Output --   Net 1600 ml       Ortho/SPM Exam     PE:  Gen:  No acute distress  CV:  Peripherally well-perfused.    Lungs:  Normal respiratory effort.  Head/Neck:  Normocephalic.  Atraumatic.     Spine:   Wound dressings c/d/i    BUE:  SILT M/U/R  Motor intact AIN/PIN/M/U/R   Cap refill < 2s  2+ RP    BLE:  SILT Sa/Hill/DP/SP/T  Motor intact EHL/FHL/TA/Gastroc  2+ DP, 2+ PT        Significant Labs:   CBC:   Recent Labs "   Lab 08/17/19  0756   WBC 11.48   HGB 9.1*   HCT 28.9*        All pertinent labs within the past 24 hours have been reviewed.    Significant Imaging: I have reviewed and interpreted all pertinent imaging results/findings.

## 2019-08-19 NOTE — DISCHARGE SUMMARY
"[facility]  Orthopedics  Discharge Summary      Patient Name: Columba Allison  MRN: [id number]  Admission Date: 8/15/2019  Hospital Length of Stay: 3 days  Discharge Date and Time: 8/18/2019  3:14 PM  Attending Physician: No att. providers found   Discharging Provider: Michel Chua MD  Primary Care Provider: Neymar Chirinos MD    HPI: Patient is here today for PSF with Dr. Asif.     Procedure(s) (LRB):  FUSION, SPINE, WITH INSTRUMENTATION-OP (N/A)      Hospital Course: Patient presented for above procedure.  Tolerated it well and was discharged home POD3 after voiding, tolerating diet, ambulating, pain controlled. Drain was removed POD2 with decreased output.  Discharge instructions, follow-up appointment, and med rec are below.          Significant Diagnostic Studies: Labs: All labs within the past 24 hours have been reviewed    Pending Diagnostic Studies:     None        Final Active Diagnoses:    Diagnosis Date Noted POA    PRINCIPAL PROBLEM:  Scoliosis [M41.9] 08/15/2019 Yes      Problems Resolved During this Admission:      Discharged Condition: good    Disposition: Home or Self Care    Follow Up:    Patient Instructions:      COMMODE FOR HOME USE     Order Specific Question Answer Comments   Type: Standard    Height: 5' 4" (1.626 m)    Weight: 52.2 kg (115 lb)    Does patient have medical equipment at home? none    Length of need (1-99 months): 99      Diet Adult Regular     Notify your health care provider if you experience any of the following:  temperature >100.4     Notify your health care provider if you experience any of the following:  persistent nausea and vomiting or diarrhea     Notify your health care provider if you experience any of the following:  severe uncontrolled pain     Notify your health care provider if you experience any of the following:  redness, tenderness, or signs of infection (pain, swelling, redness, odor or green/yellow discharge around incision site) "     Notify your health care provider if you experience any of the following:  difficulty breathing or increased cough     Notify your health care provider if you experience any of the following:  severe persistent headache     Notify your health care provider if you experience any of the following:  worsening rash     Notify your health care provider if you experience any of the following:  persistent dizziness, light-headedness, or visual disturbances     Notify your health care provider if you experience any of the following:  increased confusion or weakness     Leave dressing on - Keep it clean, dry, and intact until clinic visit   Order Comments: Leave on until clinic follow up appointment.     Weight bearing restrictions (specify):   Order Comments: WBAT     Medications:  Reconciled Home Medications:      Medication List      START taking these medications    cyclobenzaprine 5 MG tablet  Commonly known as:  FLEXERIL  Take 1 tablet (5 mg total) by mouth 3 (three) times daily as needed for Muscle spasms.     HYDROcodone-acetaminophen 7.5-325 mg per tablet  Commonly known as:  NORCO  Take 1 tablet by mouth every 6 (six) hours as needed for Pain.     oxyCODONE 5 MG immediate release tablet  Commonly known as:  ROXICODONE  Take 1 tablet (5 mg total) by mouth every 4 (four) hours as needed for Pain.        CONTINUE taking these medications    ALPRAZolam 0.25 MG tablet  Commonly known as:  XANAX  1 tab po daily only as needed for severe anxiety     escitalopram oxalate 10 MG tablet  Commonly known as:  LEXAPRO  TAKE 1 TABLET BY MOUTH EVERY DAY     MARLISSA (28) 0.15-0.03 mg per tablet  Generic drug:  levonorgestrel-ethinyl estradiol        ASK your doctor about these medications    doxycycline 100 MG Cap  Commonly known as:  VIBRAMYCIN  Take 1 capsule (100 mg total) by mouth 2 (two) times daily. for 7 days  Ask about: Should I take this medication?            Michel Chua MD  Orthopedics  [facility]  [place]

## 2019-08-21 ENCOUNTER — TELEPHONE (OUTPATIENT)
Dept: ORTHOPEDICS | Facility: CLINIC | Age: 19
End: 2019-08-21

## 2019-08-21 NOTE — TELEPHONE ENCOUNTER
----- Message from Michel Chua MD sent at 8/19/2019  5:02 PM CDT -----  Please schedule 2-3 week f/u appointment for this post op scoli surgery patient. Thanks

## 2019-08-23 ENCOUNTER — TELEPHONE (OUTPATIENT)
Dept: ORTHOPEDICS | Facility: CLINIC | Age: 19
End: 2019-08-23

## 2019-08-23 NOTE — TELEPHONE ENCOUNTER
Spoke to pt, scheduled her p/o appt per Dr. Barba request. Pt was pleased and had no further questions.    ----- Message from Valerie Kaur sent at 8/23/2019 11:21 AM CDT -----  Contact: Mother/ [phone] or [phone]  Patient mother would like a call back to make a post-op appt for any day after 2pm.

## 2019-08-26 RX ORDER — HYDROCODONE BITARTRATE AND ACETAMINOPHEN 7.5; 325 MG/1; MG/1
1 TABLET ORAL EVERY 6 HOURS PRN
Qty: 40 TABLET | Refills: 0 | Status: CANCELLED | OUTPATIENT
Start: 2019-08-26

## 2019-08-26 RX ORDER — OXYCODONE HYDROCHLORIDE 5 MG/1
5 TABLET ORAL EVERY 4 HOURS PRN
Qty: 40 TABLET | Refills: 0 | Status: CANCELLED | OUTPATIENT
Start: 2019-08-26

## 2019-08-26 RX ORDER — CYCLOBENZAPRINE HCL 5 MG
5 TABLET ORAL 3 TIMES DAILY PRN
Qty: 30 TABLET | Refills: 0 | Status: CANCELLED | OUTPATIENT
Start: 2019-08-26 | End: 2019-09-05

## 2019-08-27 ENCOUNTER — TELEPHONE (OUTPATIENT)
Dept: ORTHOPEDICS | Facility: CLINIC | Age: 19
End: 2019-08-27

## 2019-08-27 NOTE — TELEPHONE ENCOUNTER
----- Message from Brooklyn Jon sent at 8/27/2019 11:30 AM CDT -----  Contact: self  Pt requesting a refill for HYDROcodone-acetaminophen (NORCO) 7.5-325 mg per tablet;  oxyCODONE (ROXICODONE) 5 MG immediate release tablet and cyclobenzaprine (FLEXERIL) 5 MG tablet      [facility] [address] [facility] [address] [address]  Phone: [phone] Fax: [phone]

## 2019-08-27 NOTE — TELEPHONE ENCOUNTER
----- Message from Roslyn Luciano sent at 8/27/2019 12:33 PM CDT -----  Contact: Mother/Erica  Mother called in attempting to refill both pain medication for patient pls contact mother regarding refill request       Mother can be reached at [phone](until 2pm) or [phone]

## 2019-08-29 RX ORDER — CYCLOBENZAPRINE HCL 5 MG
5 TABLET ORAL 3 TIMES DAILY PRN
Qty: 30 TABLET | Refills: 0 | Status: CANCELLED | OUTPATIENT
Start: 2019-08-26 | End: 2019-09-05

## 2019-08-29 RX ORDER — HYDROCODONE BITARTRATE AND ACETAMINOPHEN 7.5; 325 MG/1; MG/1
1 TABLET ORAL EVERY 6 HOURS PRN
Qty: 40 TABLET | Refills: 0 | Status: CANCELLED | OUTPATIENT
Start: 2019-08-26

## 2019-08-29 RX ORDER — OXYCODONE HYDROCHLORIDE 5 MG/1
5 TABLET ORAL EVERY 4 HOURS PRN
Qty: 40 TABLET | Refills: 0 | Status: CANCELLED | OUTPATIENT
Start: 2019-08-26

## 2019-08-30 RX ORDER — OXYCODONE HYDROCHLORIDE 5 MG/1
5 TABLET ORAL EVERY 4 HOURS PRN
Qty: 40 TABLET | Refills: 0 | Status: CANCELLED | OUTPATIENT
Start: 2019-08-26

## 2019-08-30 RX ORDER — HYDROCODONE BITARTRATE AND ACETAMINOPHEN 7.5; 325 MG/1; MG/1
1 TABLET ORAL EVERY 6 HOURS PRN
Qty: 40 TABLET | Refills: 0 | Status: CANCELLED | OUTPATIENT
Start: 2019-08-26

## 2019-08-30 RX ORDER — CYCLOBENZAPRINE HCL 5 MG
5 TABLET ORAL 3 TIMES DAILY PRN
Qty: 30 TABLET | Refills: 0 | Status: CANCELLED | OUTPATIENT
Start: 2019-08-26 | End: 2019-09-05

## 2019-09-05 RX ORDER — CYCLOBENZAPRINE HCL 5 MG
5 TABLET ORAL 3 TIMES DAILY PRN
Qty: 30 TABLET | Refills: 0 | Status: CANCELLED | OUTPATIENT
Start: 2019-09-05 | End: 2019-09-15

## 2019-09-05 RX ORDER — OXYCODONE HYDROCHLORIDE 5 MG/1
5 TABLET ORAL EVERY 4 HOURS PRN
Qty: 40 TABLET | Refills: 0 | Status: CANCELLED | OUTPATIENT
Start: 2019-09-05

## 2019-09-05 RX ORDER — HYDROCODONE BITARTRATE AND ACETAMINOPHEN 7.5; 325 MG/1; MG/1
1 TABLET ORAL EVERY 6 HOURS PRN
Qty: 40 TABLET | Refills: 0 | Status: CANCELLED | OUTPATIENT
Start: 2019-09-05

## 2019-09-10 ENCOUNTER — HOSPITAL ENCOUNTER (OUTPATIENT)
Dept: RADIOLOGY | Facility: HOSPITAL | Age: 19
Discharge: HOME | End: 2019-09-10
Attending: ORTHOPAEDIC SURGERY
Payer: COMMERCIAL

## 2019-09-10 ENCOUNTER — OFFICE VISIT (OUTPATIENT)
Dept: ORTHOPEDICS | Facility: CLINIC | Age: 19
End: 2019-09-10
Payer: COMMERCIAL

## 2019-09-10 VITALS — BODY MASS INDEX: 18.6 KG/M2 | HEIGHT: 66 IN | WEIGHT: 115.75 LBS

## 2019-09-10 DIAGNOSIS — M41.124 ADOLESCENT IDIOPATHIC SCOLIOSIS, THORACIC REGION: Primary | ICD-10-CM

## 2019-09-10 DIAGNOSIS — M41.124 ADOLESCENT IDIOPATHIC SCOLIOSIS, THORACIC REGION: ICD-10-CM

## 2019-09-10 DIAGNOSIS — M41.124 ADOLESCENT IDIOPATHIC SCOLIOSIS OF THORACIC REGION: Primary | ICD-10-CM

## 2019-09-10 PROCEDURE — 99999 PR PBB SHADOW E&M-EST. PATIENT-LVL III: ICD-10-PCS | Mod: PBBFAC,,, | Performed by: ORTHOPAEDIC SURGERY

## 2019-09-10 PROCEDURE — 99024 POSTOP FOLLOW-UP VISIT: CPT | Mod: S$GLB,,, | Performed by: ORTHOPAEDIC SURGERY

## 2019-09-10 PROCEDURE — 99999 PR PBB SHADOW E&M-EST. PATIENT-LVL III: CPT | Mod: PBBFAC,,, | Performed by: ORTHOPAEDIC SURGERY

## 2019-09-10 PROCEDURE — 72082 XR SCOLIOSIS COMPLETE: ICD-10-PCS | Mod: 26,,, | Performed by: RADIOLOGY

## 2019-09-10 PROCEDURE — 72082 X-RAY EXAM ENTIRE SPI 2/3 VW: CPT | Mod: 26,,, | Performed by: RADIOLOGY

## 2019-09-10 PROCEDURE — 72082 X-RAY EXAM ENTIRE SPI 2/3 VW: CPT | Mod: TC

## 2019-09-10 PROCEDURE — 99024 PR POST-OP FOLLOW-UP VISIT: ICD-10-PCS | Mod: S$GLB,,, | Performed by: ORTHOPAEDIC SURGERY

## 2019-09-10 NOTE — PROGRESS NOTES
Columba is here for a follow up for scoliosis. Post fusion 8/15/19  Pain well controlled.   Outpatient Medications Marked as Taking for the 9/10/19 encounter (Office Visit) with Alexander Asif MD   Medication Sig Dispense Refill    ALPRAZolam (XANAX) 0.25 MG tablet 1 tab po daily only as needed for severe anxiety 30 tablet 0    escitalopram oxalate (LEXAPRO) 10 MG tablet TAKE 1 TABLET BY MOUTH EVERY DAY 90 tablet 1    HYDROcodone-acetaminophen (NORCO) 7.5-325 mg per tablet Take 1 tablet by mouth every 6 (six) hours as needed for Pain. 40 tablet 0    MARLISSA 0.15-0.03 mg per tablet       oxyCODONE (ROXICODONE) 5 MG immediate release tablet Take 1 tablet (5 mg total) by mouth every 4 (four) hours as needed for Pain. 40 tablet 0       Review of Symptoms: No fevers or neuro changess  Active Ambulatory Problems     Diagnosis Date Noted    Ganglion cyst 03/11/2014    Idiopathic scoliosis 03/30/2015    KRISTY (generalized anxiety disorder) 12/11/2018    Scoliosis 08/15/2019     Resolved Ambulatory Problems     Diagnosis Date Noted    No Resolved Ambulatory Problems     Past Medical History:   Diagnosis Date    KRISTY (generalized anxiety disorder) 12/11/2018    Scoliosis        Physical Exam    Patient alert and oriented  All extremities pink and warm with good cap refill and no edema.   Gait normal.    Motor exam upper and lower extremities intact  Back shows good early rom  Rotation and deformity well corrected    Xrays  Xrays were done today  and by my reading,   and show a right mid thoracic curve of 12 degrees T7-12, a left lumbar curve of 8 degrees T12-L4 and a left upper thoracic curve of 0 Degrees.    Kyphosis 23 and lordosis 61     Impresion   Scoliosis doing well post fusion    Plan  Doing well post fusion.  Discussed activity restrictions.  Follow up 2-3 months with new microdose PA scoliosis xray.

## 2019-12-11 RX ORDER — ESCITALOPRAM OXALATE 10 MG/1
TABLET ORAL
Qty: 30 TABLET | Refills: 0 | Status: SHIPPED | OUTPATIENT
Start: 2019-12-11 | End: 2021-11-10

## 2019-12-11 NOTE — TELEPHONE ENCOUNTER
----- Message from Neymar Chirinos MD sent at 12/11/2019 12:19 PM CST -----  30 days ssri sent. Needs fu and phys

## 2019-12-14 RX ORDER — ESCITALOPRAM OXALATE 10 MG/1
TABLET ORAL
Qty: 30 TABLET | Refills: 0 | Status: SHIPPED | OUTPATIENT
Start: 2019-12-14 | End: 2021-11-10

## 2019-12-16 ENCOUNTER — TELEPHONE (OUTPATIENT)
Dept: PRIMARY CARE CLINIC | Facility: CLINIC | Age: 19
End: 2019-12-16

## 2019-12-16 NOTE — TELEPHONE ENCOUNTER
----- Message from Neymar Chirinos MD sent at 12/14/2019  7:55 AM CST -----  30 days ssri med sent. Needs fu

## 2020-02-11 ENCOUNTER — PATIENT MESSAGE (OUTPATIENT)
Dept: PRIMARY CARE CLINIC | Facility: CLINIC | Age: 20
End: 2020-02-11

## 2020-02-11 RX ORDER — ESCITALOPRAM OXALATE 10 MG/1
10 TABLET ORAL DAILY
Qty: 30 TABLET | Refills: 0 | OUTPATIENT
Start: 2020-02-11

## 2020-02-11 RX ORDER — ALPRAZOLAM 0.25 MG/1
TABLET ORAL
Qty: 30 TABLET | Refills: 0 | OUTPATIENT
Start: 2020-02-11

## 2020-03-10 RX ORDER — ESCITALOPRAM OXALATE 10 MG/1
TABLET ORAL
Qty: 30 TABLET | Refills: 0 | OUTPATIENT
Start: 2020-03-10

## 2020-03-26 DIAGNOSIS — M41.124 ADOLESCENT IDIOPATHIC SCOLIOSIS, THORACIC REGION: Primary | ICD-10-CM

## 2020-03-31 ENCOUNTER — PATIENT OUTREACH (OUTPATIENT)
Dept: ADMINISTRATIVE | Facility: HOSPITAL | Age: 20
End: 2020-03-31

## 2020-03-31 NOTE — PROGRESS NOTES
Pre-visit chart review completed.  Immunizations reviewed and updated.    Patient due for the following    HIV Screening     Chlamydia Screening     Influenza Vaccine (1)

## 2020-10-08 ENCOUNTER — PATIENT MESSAGE (OUTPATIENT)
Dept: PRIMARY CARE CLINIC | Facility: CLINIC | Age: 20
End: 2020-10-08

## 2021-01-04 ENCOUNTER — PATIENT MESSAGE (OUTPATIENT)
Dept: ADMINISTRATIVE | Facility: HOSPITAL | Age: 21
End: 2021-01-04

## 2021-04-05 ENCOUNTER — PATIENT MESSAGE (OUTPATIENT)
Dept: ADMINISTRATIVE | Facility: HOSPITAL | Age: 21
End: 2021-04-05

## 2021-04-15 ENCOUNTER — PATIENT MESSAGE (OUTPATIENT)
Dept: RESEARCH | Facility: HOSPITAL | Age: 21
End: 2021-04-15

## 2021-07-06 ENCOUNTER — PATIENT MESSAGE (OUTPATIENT)
Dept: ADMINISTRATIVE | Facility: HOSPITAL | Age: 21
End: 2021-07-06

## 2021-11-02 DIAGNOSIS — M41.124 ADOLESCENT IDIOPATHIC SCOLIOSIS, THORACIC REGION: Primary | ICD-10-CM

## 2021-11-05 ENCOUNTER — OFFICE VISIT (OUTPATIENT)
Dept: ORTHOPEDICS | Facility: CLINIC | Age: 21
End: 2021-11-05
Payer: COMMERCIAL

## 2021-11-05 ENCOUNTER — HOSPITAL ENCOUNTER (OUTPATIENT)
Dept: RADIOLOGY | Facility: HOSPITAL | Age: 21
Discharge: HOME | End: 2021-11-05
Attending: ORTHOPAEDIC SURGERY
Payer: COMMERCIAL

## 2021-11-05 VITALS — HEIGHT: 64 IN | WEIGHT: 121.5 LBS | BODY MASS INDEX: 20.74 KG/M2

## 2021-11-05 DIAGNOSIS — M41.124 ADOLESCENT IDIOPATHIC SCOLIOSIS OF THORACIC REGION: Primary | ICD-10-CM

## 2021-11-05 DIAGNOSIS — M41.124 ADOLESCENT IDIOPATHIC SCOLIOSIS, THORACIC REGION: ICD-10-CM

## 2021-11-05 PROCEDURE — 99999 PR PBB SHADOW E&M-EST. PATIENT-LVL II: ICD-10-PCS | Mod: PBBFAC,,, | Performed by: ORTHOPAEDIC SURGERY

## 2021-11-05 PROCEDURE — 72082 X-RAY EXAM ENTIRE SPI 2/3 VW: CPT | Mod: 26,,, | Performed by: RADIOLOGY

## 2021-11-05 PROCEDURE — 72082 XR SCOLIOSIS COMPLETE: ICD-10-PCS | Mod: 26,,, | Performed by: RADIOLOGY

## 2021-11-05 PROCEDURE — 99213 OFFICE O/P EST LOW 20 MIN: CPT | Mod: S$GLB,,, | Performed by: ORTHOPAEDIC SURGERY

## 2021-11-05 PROCEDURE — 99213 PR OFFICE/OUTPT VISIT, EST, LEVL III, 20-29 MIN: ICD-10-PCS | Mod: S$GLB,,, | Performed by: ORTHOPAEDIC SURGERY

## 2021-11-05 PROCEDURE — 99999 PR PBB SHADOW E&M-EST. PATIENT-LVL II: CPT | Mod: PBBFAC,,, | Performed by: ORTHOPAEDIC SURGERY

## 2021-11-05 PROCEDURE — 72082 X-RAY EXAM ENTIRE SPI 2/3 VW: CPT | Mod: TC

## 2021-11-05 RX ORDER — NAPROXEN 500 MG/1
500 TABLET ORAL 2 TIMES DAILY WITH MEALS
Qty: 60 TABLET | Refills: 1 | Status: SHIPPED | OUTPATIENT
Start: 2021-11-05 | End: 2022-11-05

## 2021-11-10 ENCOUNTER — OFFICE VISIT (OUTPATIENT)
Dept: PRIMARY CARE CLINIC | Facility: CLINIC | Age: 21
End: 2021-11-10
Payer: COMMERCIAL

## 2021-11-10 VITALS
HEIGHT: 64 IN | WEIGHT: 122.38 LBS | SYSTOLIC BLOOD PRESSURE: 104 MMHG | OXYGEN SATURATION: 98 % | TEMPERATURE: 98 F | DIASTOLIC BLOOD PRESSURE: 70 MMHG | HEART RATE: 82 BPM | BODY MASS INDEX: 20.89 KG/M2

## 2021-11-10 DIAGNOSIS — F43.22 ADJUSTMENT REACTION WITH ANXIOUS MOOD: ICD-10-CM

## 2021-11-10 DIAGNOSIS — Z00.00 GENERAL MEDICAL EXAM: Primary | ICD-10-CM

## 2021-11-10 PROCEDURE — 99395 PR PREVENTIVE VISIT,EST,18-39: ICD-10-PCS | Mod: S$GLB,,, | Performed by: FAMILY MEDICINE

## 2021-11-10 PROCEDURE — 99999 PR PBB SHADOW E&M-EST. PATIENT-LVL III: ICD-10-PCS | Mod: PBBFAC,,, | Performed by: FAMILY MEDICINE

## 2021-11-10 PROCEDURE — 99999 PR PBB SHADOW E&M-EST. PATIENT-LVL III: CPT | Mod: PBBFAC,,, | Performed by: FAMILY MEDICINE

## 2021-11-10 PROCEDURE — 99395 PREV VISIT EST AGE 18-39: CPT | Mod: S$GLB,,, | Performed by: FAMILY MEDICINE

## 2021-11-10 RX ORDER — ALPRAZOLAM 0.25 MG/1
TABLET ORAL
Qty: 30 TABLET | Refills: 0 | Status: SHIPPED | OUTPATIENT
Start: 2021-11-10 | End: 2023-04-14 | Stop reason: SDUPTHER

## 2021-11-10 RX ORDER — ESCITALOPRAM OXALATE 10 MG/1
10 TABLET ORAL DAILY
Qty: 30 TABLET | Refills: 0 | Status: SHIPPED | OUTPATIENT
Start: 2021-11-10 | End: 2021-12-14

## 2021-11-12 PROBLEM — F43.22 ADJUSTMENT REACTION WITH ANXIOUS MOOD: Status: ACTIVE | Noted: 2021-11-12

## 2021-11-12 PROBLEM — F43.29 ADJUSTMENT REACTION WITH MIXED EMOTIONAL FEATURES: Status: RESOLVED | Noted: 2021-11-12 | Resolved: 2021-11-12

## 2021-11-12 PROBLEM — F43.29 ADJUSTMENT REACTION WITH MIXED EMOTIONAL FEATURES: Status: ACTIVE | Noted: 2021-11-12

## 2021-11-12 PROBLEM — F41.1 GAD (GENERALIZED ANXIETY DISORDER): Status: RESOLVED | Noted: 2018-12-11 | Resolved: 2021-11-12

## 2021-12-03 ENCOUNTER — LAB VISIT (OUTPATIENT)
Dept: LAB | Facility: HOSPITAL | Age: 21
End: 2021-12-03
Attending: FAMILY MEDICINE
Payer: COMMERCIAL

## 2021-12-03 DIAGNOSIS — Z00.00 GENERAL MEDICAL EXAM: ICD-10-CM

## 2021-12-03 LAB
ALBUMIN SERPL BCP-MCNC: 4.1 G/DL (ref 3.5–5.2)
ALP SERPL-CCNC: 57 U/L (ref 55–135)
ALT SERPL W/O P-5'-P-CCNC: 14 U/L (ref 10–44)
ANION GAP SERPL CALC-SCNC: 9 MMOL/L (ref 8–16)
AST SERPL-CCNC: 22 U/L (ref 10–40)
BILIRUB SERPL-MCNC: 0.8 MG/DL (ref 0.1–1)
BUN SERPL-MCNC: 15 MG/DL (ref 6–20)
CALCIUM SERPL-MCNC: 9.5 MG/DL (ref 8.7–10.5)
CHLORIDE SERPL-SCNC: 106 MMOL/L (ref 95–110)
CHOLEST SERPL-MCNC: 142 MG/DL (ref 120–199)
CHOLEST/HDLC SERPL: 2.4 {RATIO} (ref 2–5)
CO2 SERPL-SCNC: 23 MMOL/L (ref 23–29)
CREAT SERPL-MCNC: 0.7 MG/DL (ref 0.5–1.4)
ERYTHROCYTE [DISTWIDTH] IN BLOOD BY AUTOMATED COUNT: 13.7 % (ref 11.5–14.5)
EST. GFR  (AFRICAN AMERICAN): >60 ML/MIN/1.73 M^2
EST. GFR  (NON AFRICAN AMERICAN): >60 ML/MIN/1.73 M^2
ESTIMATED AVG GLUCOSE: 94 MG/DL (ref 68–131)
GLUCOSE SERPL-MCNC: 79 MG/DL (ref 70–110)
HBA1C MFR BLD: 4.9 % (ref 4–5.6)
HCT VFR BLD AUTO: 42.6 % (ref 37–48.5)
HDLC SERPL-MCNC: 60 MG/DL (ref 40–75)
HDLC SERPL: 42.3 % (ref 20–50)
HGB BLD-MCNC: 13.6 G/DL (ref 12–16)
LDLC SERPL CALC-MCNC: 75.4 MG/DL (ref 63–159)
MCH RBC QN AUTO: 30.8 PG (ref 27–31)
MCHC RBC AUTO-ENTMCNC: 31.9 G/DL (ref 32–36)
MCV RBC AUTO: 97 FL (ref 82–98)
NONHDLC SERPL-MCNC: 82 MG/DL
PLATELET # BLD AUTO: 301 K/UL (ref 150–450)
PMV BLD AUTO: 9.5 FL (ref 9.2–12.9)
POTASSIUM SERPL-SCNC: 4.1 MMOL/L (ref 3.5–5.1)
PROT SERPL-MCNC: 7.2 G/DL (ref 6–8.4)
RBC # BLD AUTO: 4.41 M/UL (ref 4–5.4)
SODIUM SERPL-SCNC: 138 MMOL/L (ref 136–145)
T4 FREE SERPL-MCNC: 0.91 NG/DL (ref 0.71–1.51)
TRIGL SERPL-MCNC: 33 MG/DL (ref 30–150)
TSH SERPL DL<=0.005 MIU/L-ACNC: 2.08 UIU/ML (ref 0.4–4)
WBC # BLD AUTO: 6.35 K/UL (ref 3.9–12.7)

## 2021-12-03 PROCEDURE — 36415 COLL VENOUS BLD VENIPUNCTURE: CPT | Mod: PN | Performed by: FAMILY MEDICINE

## 2021-12-03 PROCEDURE — 80053 COMPREHEN METABOLIC PANEL: CPT | Performed by: FAMILY MEDICINE

## 2021-12-03 PROCEDURE — 84443 ASSAY THYROID STIM HORMONE: CPT | Performed by: FAMILY MEDICINE

## 2021-12-03 PROCEDURE — 84439 ASSAY OF FREE THYROXINE: CPT | Performed by: FAMILY MEDICINE

## 2021-12-03 PROCEDURE — 83036 HEMOGLOBIN GLYCOSYLATED A1C: CPT | Performed by: FAMILY MEDICINE

## 2021-12-03 PROCEDURE — 80061 LIPID PANEL: CPT | Performed by: FAMILY MEDICINE

## 2021-12-03 PROCEDURE — 85027 COMPLETE CBC AUTOMATED: CPT | Performed by: FAMILY MEDICINE

## 2021-12-14 ENCOUNTER — PATIENT MESSAGE (OUTPATIENT)
Dept: PRIMARY CARE CLINIC | Facility: CLINIC | Age: 21
End: 2021-12-14
Payer: COMMERCIAL

## 2021-12-14 RX ORDER — ESCITALOPRAM OXALATE 10 MG/1
TABLET ORAL
Qty: 90 TABLET | Refills: 3 | Status: SHIPPED | OUTPATIENT
Start: 2021-12-14 | End: 2022-12-15

## 2021-12-15 ENCOUNTER — PATIENT MESSAGE (OUTPATIENT)
Dept: PRIMARY CARE CLINIC | Facility: CLINIC | Age: 21
End: 2021-12-15
Payer: COMMERCIAL

## 2021-12-15 DIAGNOSIS — M41.124 ADOLESCENT IDIOPATHIC SCOLIOSIS OF THORACIC REGION: Primary | ICD-10-CM

## 2021-12-17 ENCOUNTER — HOSPITAL ENCOUNTER (OUTPATIENT)
Dept: RADIOLOGY | Facility: HOSPITAL | Age: 21
Discharge: HOME | End: 2021-12-17
Attending: ORTHOPAEDIC SURGERY
Payer: COMMERCIAL

## 2021-12-17 ENCOUNTER — OFFICE VISIT (OUTPATIENT)
Dept: ORTHOPEDICS | Facility: CLINIC | Age: 21
End: 2021-12-17
Payer: COMMERCIAL

## 2021-12-17 VITALS — HEIGHT: 64 IN | BODY MASS INDEX: 20.06 KG/M2 | WEIGHT: 117.5 LBS

## 2021-12-17 DIAGNOSIS — M41.124 ADOLESCENT IDIOPATHIC SCOLIOSIS OF THORACIC REGION: ICD-10-CM

## 2021-12-17 DIAGNOSIS — M41.124 ADOLESCENT IDIOPATHIC SCOLIOSIS OF THORACIC REGION: Primary | ICD-10-CM

## 2021-12-17 PROCEDURE — 99213 PR OFFICE/OUTPT VISIT, EST, LEVL III, 20-29 MIN: ICD-10-PCS | Mod: S$GLB,,, | Performed by: ORTHOPAEDIC SURGERY

## 2021-12-17 PROCEDURE — 99999 PR PBB SHADOW E&M-EST. PATIENT-LVL II: CPT | Mod: PBBFAC,,, | Performed by: ORTHOPAEDIC SURGERY

## 2021-12-17 PROCEDURE — 99213 OFFICE O/P EST LOW 20 MIN: CPT | Mod: S$GLB,,, | Performed by: ORTHOPAEDIC SURGERY

## 2021-12-17 PROCEDURE — 99999 PR PBB SHADOW E&M-EST. PATIENT-LVL II: ICD-10-PCS | Mod: PBBFAC,,, | Performed by: ORTHOPAEDIC SURGERY

## 2021-12-17 PROCEDURE — 72020 XR SPINE 1 VIEW ANY LEVEL: ICD-10-PCS | Mod: 26,,, | Performed by: RADIOLOGY

## 2021-12-17 PROCEDURE — 72020 X-RAY EXAM OF SPINE 1 VIEW: CPT | Mod: TC

## 2021-12-17 PROCEDURE — 72020 X-RAY EXAM OF SPINE 1 VIEW: CPT | Mod: 26,,, | Performed by: RADIOLOGY

## 2022-01-18 ENCOUNTER — PATIENT MESSAGE (OUTPATIENT)
Dept: ADMINISTRATIVE | Facility: HOSPITAL | Age: 22
End: 2022-01-18
Payer: COMMERCIAL

## 2022-12-15 RX ORDER — ESCITALOPRAM OXALATE 10 MG/1
TABLET ORAL
Qty: 90 TABLET | Refills: 3 | Status: SHIPPED | OUTPATIENT
Start: 2022-12-15 | End: 2022-12-30

## 2022-12-15 NOTE — TELEPHONE ENCOUNTER
Care Due:                  Date            Visit Type   Department     Provider  --------------------------------------------------------------------------------                                MYCHART                              FOLLOWUP/OF  [facility]  Last Visit: 11-      [facility] VISIT   LIZY Chirinos  Next Visit: None Scheduled  None         None Found                                                            Last  Test          Frequency    Reason                     Performed    Due Date  --------------------------------------------------------------------------------    Office Visit  12 months..  EScitalopram.............  11-   11-    [facility] Embedded Care Gaps. Reference number: [id number]. 12/15/2022   12:39:57 AM CST

## 2022-12-15 NOTE — TELEPHONE ENCOUNTER
Refill Decision Note   Columba Allison  is requesting a refill authorization.  Brief Assessment and Rationale for Refill:  Approve     Medication Therapy Plan:       Medication Reconciliation Completed: No   Comments:     Provider Staff:     Action is required for this patient.   Please see care gap opportunities below in Care Due Message.     Thanks!  [facility]     Appointments      Date Provider   Last Visit   11/10/2021 Neymar Chirinos MD   Next Visit   Visit date not found Neymar Chirinos MD     Note composed:7:42 AM 12/15/2022           Note composed:7:42 AM 12/15/2022

## 2022-12-19 DIAGNOSIS — M41.124 ADOLESCENT IDIOPATHIC SCOLIOSIS OF THORACIC REGION: Primary | ICD-10-CM

## 2022-12-21 NOTE — PROGRESS NOTES
Columba is here for a follow up for scoliosis. Post fusion 8/15/19 Seen for back pain 11/3/21. In the past  Pain resolved.      No outpatient medications have been marked as taking for the 12/22/22 encounter (Appointment) with Alexander Asif MD.       Review of Symptoms: No fevers or neuro changess  Active Ambulatory Problems     Diagnosis Date Noted    Ganglion cyst 03/11/2014    Idiopathic scoliosis 03/30/2015    Scoliosis 08/15/2019    Adjustment reaction with anxious mood 11/12/2021     Resolved Ambulatory Problems     Diagnosis Date Noted    KRISTY (generalized anxiety disorder) 12/11/2018    Adjustment reaction with mixed emotional features 11/12/2021     No Additional Past Medical History       Physical Exam    Patient alert and oriented  All extremities pink and warm with good cap refill and no edema.   Gait normal.    Motor exam upper and lower extremities intact  Back shows good early rom  Rotation and deformity well corrected 6 degrees upper thoracic and 6 degrees right thoracic.     Xrays my read  Lumbar spine normal. Still a subtle shadow L4 pars.  But no obvious spond.     Last visit  Xrays were done today  and by my reading,   and show a right mid thoracic curve of 22 degrees T7-12, a left lumbar curve of 8 degrees T12-L4  Kyphosis 38 and lordosis 91 Risser 5.     Impresion   Scoliosis doing well post fusion    Plan  Doing well.  Follow up one year with pa and lat scoli

## 2022-12-22 ENCOUNTER — OFFICE VISIT (OUTPATIENT)
Dept: ORTHOPEDICS | Facility: CLINIC | Age: 22
End: 2022-12-22
Payer: COMMERCIAL

## 2022-12-22 ENCOUNTER — HOSPITAL ENCOUNTER (OUTPATIENT)
Dept: RADIOLOGY | Facility: HOSPITAL | Age: 22
Discharge: HOME | End: 2022-12-22
Attending: ORTHOPAEDIC SURGERY
Payer: COMMERCIAL

## 2022-12-22 VITALS — HEIGHT: 64 IN | WEIGHT: 117.5 LBS | BODY MASS INDEX: 20.06 KG/M2

## 2022-12-22 DIAGNOSIS — M41.124 ADOLESCENT IDIOPATHIC SCOLIOSIS OF THORACIC REGION: Primary | ICD-10-CM

## 2022-12-22 DIAGNOSIS — M41.124 ADOLESCENT IDIOPATHIC SCOLIOSIS OF THORACIC REGION: ICD-10-CM

## 2022-12-22 PROCEDURE — 72082 X-RAY EXAM ENTIRE SPI 2/3 VW: CPT | Mod: 26,,, | Performed by: RADIOLOGY

## 2022-12-22 PROCEDURE — 99999 PR PBB SHADOW E&M-EST. PATIENT-LVL II: CPT | Mod: PBBFAC,,, | Performed by: ORTHOPAEDIC SURGERY

## 2022-12-22 PROCEDURE — 99999 PR PBB SHADOW E&M-EST. PATIENT-LVL II: ICD-10-PCS | Mod: PBBFAC,,, | Performed by: ORTHOPAEDIC SURGERY

## 2022-12-22 PROCEDURE — 99213 PR OFFICE/OUTPT VISIT, EST, LEVL III, 20-29 MIN: ICD-10-PCS | Mod: S$GLB,,, | Performed by: ORTHOPAEDIC SURGERY

## 2022-12-22 PROCEDURE — 72082 XR SCOLIOSIS COMPLETE: ICD-10-PCS | Mod: 26,,, | Performed by: RADIOLOGY

## 2022-12-22 PROCEDURE — 99213 OFFICE O/P EST LOW 20 MIN: CPT | Mod: S$GLB,,, | Performed by: ORTHOPAEDIC SURGERY

## 2022-12-22 PROCEDURE — 72082 X-RAY EXAM ENTIRE SPI 2/3 VW: CPT | Mod: TC

## 2022-12-23 ENCOUNTER — PATIENT MESSAGE (OUTPATIENT)
Dept: PRIMARY CARE CLINIC | Facility: CLINIC | Age: 22
End: 2022-12-23
Payer: COMMERCIAL

## 2022-12-23 ENCOUNTER — TELEPHONE (OUTPATIENT)
Dept: PRIMARY CARE CLINIC | Facility: CLINIC | Age: 22
End: 2022-12-23
Payer: COMMERCIAL

## 2022-12-23 NOTE — TELEPHONE ENCOUNTER
----- Message from Neymar Chirinos MD sent at 12/23/2022  7:00 AM CST -----  Call pharmacy and change this refill to 30 days without refills.  I will send another prescription but they will likely ignore it so please call them.  Our Refill clinic gave this patient 1 year supply but I have not seen her in 13 months.  Thank you

## 2022-12-23 NOTE — TELEPHONE ENCOUNTER
Pt had already picked up the 90 day supply. Refills have been cancelled at the pharmacy.     Left a voicemail for pt advising appt is over due. Requested a return phone call to schedule.

## 2022-12-30 RX ORDER — ESCITALOPRAM OXALATE 10 MG/1
10 TABLET ORAL DAILY
COMMUNITY
End: 2023-03-22 | Stop reason: SDUPTHER

## 2022-12-30 NOTE — TELEPHONE ENCOUNTER
Lexapro order discontinued due to cosign declined by Neymar Chirinos MD with comment stating 12-month supply not preferred. Entered patient-reported order on med list for placeholder.

## 2023-03-22 ENCOUNTER — PATIENT MESSAGE (OUTPATIENT)
Dept: PRIMARY CARE CLINIC | Facility: CLINIC | Age: 23
End: 2023-03-22
Payer: COMMERCIAL

## 2023-03-22 DIAGNOSIS — F43.22 ADJUSTMENT REACTION WITH ANXIOUS MOOD: Primary | ICD-10-CM

## 2023-03-22 NOTE — TELEPHONE ENCOUNTER
LOV 11/21/21  Appt scheduled for 04/14/23    Pt requesting medication refill to last until her appt. 30 day supply pended.

## 2023-03-22 NOTE — TELEPHONE ENCOUNTER
No new care gaps identified.  [facility] Embedded Care Gaps. Reference number: [id number]. 3/22/2023   9:23:14 AM FLEXT

## 2023-03-23 RX ORDER — ESCITALOPRAM OXALATE 10 MG/1
10 TABLET ORAL DAILY
Qty: 30 TABLET | Refills: 0 | Status: SHIPPED | OUTPATIENT
Start: 2023-03-23 | End: 2023-04-14 | Stop reason: SDUPTHER

## 2023-03-24 ENCOUNTER — PATIENT OUTREACH (OUTPATIENT)
Dept: ADMINISTRATIVE | Facility: HOSPITAL | Age: 23
End: 2023-03-24
Payer: COMMERCIAL

## 2023-03-24 NOTE — PROGRESS NOTES
Patient due for the following    Hepatitis C Screening     Pneumococcal Vaccines (Age 0-64) (1 - PCV)    HIV Screening     Pap Smear     TETANUS VACCINE     COVID-19 Vaccine (3 - Booster for Moderna series)    Influenza Vaccine (1)      Immunizations: reviewed and updated  Care Everywhere: triggered  Care Teams: up to date  Outreach: none needed

## 2023-04-14 ENCOUNTER — OFFICE VISIT (OUTPATIENT)
Dept: PRIMARY CARE CLINIC | Facility: CLINIC | Age: 23
End: 2023-04-14
Payer: COMMERCIAL

## 2023-04-14 VITALS
OXYGEN SATURATION: 98 % | TEMPERATURE: 98 F | SYSTOLIC BLOOD PRESSURE: 110 MMHG | HEIGHT: 64 IN | BODY MASS INDEX: 21.83 KG/M2 | HEART RATE: 61 BPM | DIASTOLIC BLOOD PRESSURE: 70 MMHG | WEIGHT: 127.88 LBS

## 2023-04-14 DIAGNOSIS — Z00.00 WELL ADULT EXAM: Primary | ICD-10-CM

## 2023-04-14 DIAGNOSIS — F41.1 GAD (GENERALIZED ANXIETY DISORDER): ICD-10-CM

## 2023-04-14 PROBLEM — F43.22 ADJUSTMENT REACTION WITH ANXIOUS MOOD: Status: RESOLVED | Noted: 2021-11-12 | Resolved: 2023-04-14

## 2023-04-14 PROCEDURE — 99999 PR PBB SHADOW E&M-EST. PATIENT-LVL IV: CPT | Mod: PBBFAC,,, | Performed by: FAMILY MEDICINE

## 2023-04-14 PROCEDURE — 99999 PR PBB SHADOW E&M-EST. PATIENT-LVL IV: ICD-10-PCS | Mod: PBBFAC,,, | Performed by: FAMILY MEDICINE

## 2023-04-14 PROCEDURE — 99395 PREV VISIT EST AGE 18-39: CPT | Mod: S$GLB,,, | Performed by: FAMILY MEDICINE

## 2023-04-14 PROCEDURE — 99395 PR PREVENTIVE VISIT,EST,18-39: ICD-10-PCS | Mod: S$GLB,,, | Performed by: FAMILY MEDICINE

## 2023-04-14 RX ORDER — ESCITALOPRAM OXALATE 20 MG/1
20 TABLET ORAL DAILY
Qty: 90 TABLET | Refills: 3 | Status: SHIPPED | OUTPATIENT
Start: 2023-04-14 | End: 2023-04-14

## 2023-04-14 RX ORDER — ALPRAZOLAM 0.25 MG/1
0.25 TABLET ORAL 2 TIMES DAILY PRN
Qty: 60 TABLET | Refills: 0 | Status: SHIPPED | OUTPATIENT
Start: 2023-04-14 | End: 2023-04-19 | Stop reason: SDUPTHER

## 2023-04-14 RX ORDER — ESCITALOPRAM OXALATE 20 MG/1
20 TABLET ORAL DAILY
Qty: 90 TABLET | Refills: 3 | Status: SHIPPED | OUTPATIENT
Start: 2023-04-14 | End: 2023-10-24 | Stop reason: SDUPTHER

## 2023-04-18 ENCOUNTER — TELEPHONE (OUTPATIENT)
Dept: PRIMARY CARE CLINIC | Facility: CLINIC | Age: 23
End: 2023-04-18
Payer: COMMERCIAL

## 2023-04-18 NOTE — TELEPHONE ENCOUNTER
----- Message from Joselin Ferguson sent at 4/18/2023  2:43 PM CDT -----  Contact: pharmacy/heath/[phone]  Pharmacy is calling to clarify an RX.  RX name:  ALPRAZolam (XANAX) 0.25 MG tablet 60 tablet 0 4/14/2023   Sig: Take 1 tablet (0.25 mg total) by mouth 2 (two) times daily as needed for Anxiety. 1 tab po daily only as needed for severe anxiety  What do they need to clarify:  directions  Comments:      Please advice

## 2023-04-19 RX ORDER — ALPRAZOLAM 0.25 MG/1
0.25 TABLET ORAL 2 TIMES DAILY PRN
Qty: 60 TABLET | Refills: 0 | Status: SHIPPED | OUTPATIENT
Start: 2023-04-19

## 2023-04-20 ENCOUNTER — PATIENT MESSAGE (OUTPATIENT)
Dept: PRIMARY CARE CLINIC | Facility: CLINIC | Age: 23
End: 2023-04-20
Payer: COMMERCIAL

## 2023-04-20 PROBLEM — F41.1 GAD (GENERALIZED ANXIETY DISORDER): Status: ACTIVE | Noted: 2023-04-20

## 2023-04-20 PROBLEM — M41.9 SCOLIOSIS: Status: RESOLVED | Noted: 2019-08-15 | Resolved: 2023-04-20

## 2023-04-20 NOTE — PROGRESS NOTES
"    /70 (BP Location: Right arm, Patient Position: Sitting, BP Method: Medium (Manual))   Pulse 61   Temp 98.1 °F (36.7 °C) (Oral)   Ht 5' 4" (1.626 m)   Wt 58 kg (127 lb 13.9 oz)   LMP 04/03/2023   SpO2 98%   BMI 21.95 kg/m²       ===========    Chief Complaint: No chief complaint on file.          HPI    Columba Allison is a 22 y.o. female     here for    Annual Wellness/Preventative Exam.  Health maintenance reviewed with patient in detail inc any recent labs and studies and needs for future screening labs.  Age-appropriate vaccines and other age-appropriate screening studies reviewed with patient in detail.  Sleep health reviewed with patient.  Skin health regarding possible skin cancer screening reviewed with patient.  General regularity of bowel movements and urinations reviewed with patient including any possibility of with urine leakage.  Vision screening reviewed with patient.      Patient queried and denies any further complaints    Patient has MEDICAL HISTORY OF  Patient Active Problem List   Diagnosis    Ganglion cyst    Idiopathic scoliosis    KRISTY (generalized anxiety disorder)       SURGICAL AND MEDICAL HISTORY: updated and reviewed.  Past Surgical History:   Procedure Laterality Date    FUSION OF SPINE WITH INSTRUMENTATION N/A 8/15/2019    Procedure: FUSION, SPINE, WITH INSTRUMENTATION-OP;  Surgeon: Alexander Asif MD;  Location: [facility] OR [address] [place] [address];  Service: Orthopedics;  Laterality: N/A;     ALLERGIES updated and reviewed.  Review of patient's allergies indicates:  No Known Allergies    CURRENT OUTPATIENT MEDICATIONS updated and reviewed    Current Outpatient Medications:     ALPRAZolam (XANAX) 0.25 MG tablet, Take 1 tablet (0.25 mg total) by mouth 2 (two) times daily as needed for Anxiety., Disp: 60 tablet, Rfl: 0    EScitalopram oxalate (LEXAPRO) 20 MG tablet, Take 1 tablet (20 mg total) by mouth once daily., Disp: 90 tablet, Rfl: 3    Review of Systems   Constitutional:  Negative for " "activity change, appetite change, chills, diaphoresis, fatigue, fever and unexpected weight change.   HENT:  Negative for congestion, ear discharge, ear pain, facial swelling, hearing loss, nosebleeds, postnasal drip, rhinorrhea, sinus pressure, sneezing, sore throat, tinnitus, trouble swallowing and voice change.    Eyes:  Negative for photophobia, pain, discharge, redness, itching and visual disturbance.   Respiratory:  Negative for cough, chest tightness, shortness of breath and wheezing.    Cardiovascular:  Negative for chest pain, palpitations and leg swelling.   Gastrointestinal:  Negative for abdominal distention, abdominal pain, anal bleeding, blood in stool, constipation, diarrhea, nausea, rectal pain and vomiting.   Endocrine: Negative for cold intolerance, heat intolerance, polydipsia, polyphagia and polyuria.   Genitourinary:  Negative for difficulty urinating, dysuria and flank pain.   Musculoskeletal:  Negative for arthralgias, back pain, joint swelling, myalgias and neck pain.   Skin:  Negative for rash.   Neurological:  Negative for dizziness, tremors, seizures, syncope, speech difficulty, weakness, light-headedness, numbness and headaches.   Psychiatric/Behavioral:  Negative for behavioral problems, confusion, decreased concentration, dysphoric mood, sleep disturbance and suicidal ideas. The patient is not nervous/anxious and is not hyperactive.      /70 (BP Location: Right arm, Patient Position: Sitting, BP Method: Medium (Manual))   Pulse 61   Temp 98.1 °F (36.7 °C) (Oral)   Ht 5' 4" (1.626 m)   Wt 58 kg (127 lb 13.9 oz)   LMP 04/03/2023   SpO2 98%   BMI 21.95 kg/m²   Physical Exam  Vitals and nursing note reviewed.   Constitutional:       General: She is not in acute distress.     Appearance: Normal appearance. She is well-developed. She is not ill-appearing or toxic-appearing.   HENT:      Head: Normocephalic and atraumatic.      Right Ear: Tympanic membrane, ear canal and external " ear normal.      Left Ear: Tympanic membrane, ear canal and external ear normal.      Nose: Nose normal.      Mouth/Throat:      Lips: Pink.      Mouth: Mucous membranes are moist.      Pharynx: No oropharyngeal exudate or posterior oropharyngeal erythema.   Eyes:      General: No scleral icterus.        Right eye: No discharge.         Left eye: No discharge.      Extraocular Movements: Extraocular movements intact.      Conjunctiva/sclera: Conjunctivae normal.   Cardiovascular:      Rate and Rhythm: Normal rate and regular rhythm.      Pulses: Normal pulses.      Heart sounds: Normal heart sounds. No murmur heard.  Pulmonary:      Effort: Pulmonary effort is normal. No respiratory distress.      Breath sounds: Normal breath sounds. No wheezing or rales.   Abdominal:      General: Bowel sounds are normal. There is no distension.      Palpations: Abdomen is soft. There is no mass.      Tenderness: There is no abdominal tenderness. There is no right CVA tenderness, left CVA tenderness, guarding or rebound.      Hernia: No hernia is present.   Musculoskeletal:      Cervical back: Normal range of motion and neck supple. No rigidity or tenderness.   Lymphadenopathy:      Cervical: No cervical adenopathy.   Skin:     General: Skin is warm and dry.   Neurological:      General: No focal deficit present.      Mental Status: She is alert. Mental status is at baseline.   Psychiatric:         Mood and Affect: Mood normal.         Behavior: Behavior normal. Behavior is cooperative.       ASSESSMENT/PLAN  Diagnoses and all orders for this visit:    Well adult exam  -     CBC Without Differential; Future  -     Comprehensive Metabolic Panel; Future  -     Lipid Panel; Future  -     TSH; Future  -     Hemoglobin A1C; Future    KRISTY (generalized anxiety disorder)  -     Ambulatory referral/consult to Psychiatry; Future    Other orders  -     Discontinue: EScitalopram oxalate (LEXAPRO) 20 MG tablet; Take 1 tablet (20 mg total) by  mouth once daily.  -     Discontinue: ALPRAZolam (XANAX) 0.25 MG tablet; Take 1 tablet (0.25 mg total) by mouth 2 (two) times daily as needed for Anxiety. 1 tab po daily only as needed for severe anxiety  -     EScitalopram oxalate (LEXAPRO) 20 MG tablet; Take 1 tablet (20 mg total) by mouth once daily.            All lab results over past 2 years available reviewed inc labs and any cardiology or radiology studies.  Any new prescription medications gone over in detail including reason for taking the medication, the general mechanism of action, most common possible side effects and possible costs, etcetera.    Chronic conditions updated. Other than changes or additions as above, cont current medications and maintain follow-up with specialists if indicated.     Neymar Chirinos MD  A dictation device was used to produce this document. Use of such devices sometimes results in grammatical errors or replacement of words that sound similarly.

## 2023-04-29 ENCOUNTER — LAB VISIT (OUTPATIENT)
Dept: LAB | Facility: HOSPITAL | Age: 23
End: 2023-04-29
Attending: FAMILY MEDICINE
Payer: COMMERCIAL

## 2023-04-29 DIAGNOSIS — Z00.00 WELL ADULT EXAM: ICD-10-CM

## 2023-04-29 LAB
ALBUMIN SERPL BCP-MCNC: 4.2 G/DL (ref 3.5–5.2)
ALP SERPL-CCNC: 52 U/L (ref 55–135)
ALT SERPL W/O P-5'-P-CCNC: 21 U/L (ref 10–44)
ANION GAP SERPL CALC-SCNC: 8 MMOL/L (ref 8–16)
AST SERPL-CCNC: 20 U/L (ref 10–40)
BILIRUB SERPL-MCNC: 1 MG/DL (ref 0.1–1)
BUN SERPL-MCNC: 14 MG/DL (ref 6–20)
CALCIUM SERPL-MCNC: 9.5 MG/DL (ref 8.7–10.5)
CHLORIDE SERPL-SCNC: 107 MMOL/L (ref 95–110)
CHOLEST SERPL-MCNC: 151 MG/DL (ref 120–199)
CHOLEST/HDLC SERPL: 3 {RATIO} (ref 2–5)
CO2 SERPL-SCNC: 23 MMOL/L (ref 23–29)
CREAT SERPL-MCNC: 0.8 MG/DL (ref 0.5–1.4)
ERYTHROCYTE [DISTWIDTH] IN BLOOD BY AUTOMATED COUNT: 13.2 % (ref 11.5–14.5)
EST. GFR  (NO RACE VARIABLE): >60 ML/MIN/1.73 M^2
ESTIMATED AVG GLUCOSE: 97 MG/DL (ref 68–131)
GLUCOSE SERPL-MCNC: 82 MG/DL (ref 70–110)
HBA1C MFR BLD: 5 % (ref 4–5.6)
HCT VFR BLD AUTO: 43.6 % (ref 37–48.5)
HDLC SERPL-MCNC: 51 MG/DL (ref 40–75)
HDLC SERPL: 33.8 % (ref 20–50)
HGB BLD-MCNC: 13.5 G/DL (ref 12–16)
LDLC SERPL CALC-MCNC: 92 MG/DL (ref 63–159)
MCH RBC QN AUTO: 29.7 PG (ref 27–31)
MCHC RBC AUTO-ENTMCNC: 31 G/DL (ref 32–36)
MCV RBC AUTO: 96 FL (ref 82–98)
NONHDLC SERPL-MCNC: 100 MG/DL
PLATELET # BLD AUTO: 311 K/UL (ref 150–450)
PMV BLD AUTO: 9.7 FL (ref 9.2–12.9)
POTASSIUM SERPL-SCNC: 4.2 MMOL/L (ref 3.5–5.1)
PROT SERPL-MCNC: 7.3 G/DL (ref 6–8.4)
RBC # BLD AUTO: 4.55 M/UL (ref 4–5.4)
SODIUM SERPL-SCNC: 138 MMOL/L (ref 136–145)
TRIGL SERPL-MCNC: 40 MG/DL (ref 30–150)
TSH SERPL DL<=0.005 MIU/L-ACNC: 2.1 UIU/ML (ref 0.4–4)
WBC # BLD AUTO: 5.89 K/UL (ref 3.9–12.7)

## 2023-04-29 PROCEDURE — 85027 COMPLETE CBC AUTOMATED: CPT | Performed by: FAMILY MEDICINE

## 2023-04-29 PROCEDURE — 84443 ASSAY THYROID STIM HORMONE: CPT | Performed by: FAMILY MEDICINE

## 2023-04-29 PROCEDURE — 36415 COLL VENOUS BLD VENIPUNCTURE: CPT | Mod: PO | Performed by: FAMILY MEDICINE

## 2023-04-29 PROCEDURE — 80061 LIPID PANEL: CPT | Performed by: FAMILY MEDICINE

## 2023-04-29 PROCEDURE — 83036 HEMOGLOBIN GLYCOSYLATED A1C: CPT | Performed by: FAMILY MEDICINE

## 2023-04-29 PROCEDURE — 80053 COMPREHEN METABOLIC PANEL: CPT | Performed by: FAMILY MEDICINE

## 2023-05-03 ENCOUNTER — TELEPHONE (OUTPATIENT)
Dept: PRIMARY CARE CLINIC | Facility: CLINIC | Age: 23
End: 2023-05-03
Payer: COMMERCIAL

## 2023-05-03 NOTE — TELEPHONE ENCOUNTER
----- Message from Neymar Chirinos MD sent at 5/3/2023  6:29 AM CDT -----  Please call patient and ask her to look at her my [facility] message from about 2 weeks ago and also regarding her lab message this morning.  If she has not gotten into Psychiatry yet--I do not see an appointment yet--then I want her to follow-up with me in a week or 2.  Please message me with an update.  Thank you

## 2023-05-09 ENCOUNTER — OFFICE VISIT (OUTPATIENT)
Dept: OBSTETRICS AND GYNECOLOGY | Facility: CLINIC | Age: 23
End: 2023-05-09
Payer: COMMERCIAL

## 2023-05-09 VITALS
SYSTOLIC BLOOD PRESSURE: 102 MMHG | HEIGHT: 64 IN | DIASTOLIC BLOOD PRESSURE: 60 MMHG | WEIGHT: 123.44 LBS | BODY MASS INDEX: 21.07 KG/M2

## 2023-05-09 DIAGNOSIS — N89.8 VAGINAL ODOR: ICD-10-CM

## 2023-05-09 DIAGNOSIS — Z01.419 WOMEN'S ANNUAL ROUTINE GYNECOLOGICAL EXAMINATION: Primary | ICD-10-CM

## 2023-05-09 DIAGNOSIS — Z11.3 SCREEN FOR STD (SEXUALLY TRANSMITTED DISEASE): ICD-10-CM

## 2023-05-09 DIAGNOSIS — Z12.4 ENCOUNTER FOR PAPANICOLAOU SMEAR FOR CERVICAL CANCER SCREENING: ICD-10-CM

## 2023-05-09 PROCEDURE — 99999 PR PBB SHADOW E&M-EST. PATIENT-LVL III: ICD-10-PCS | Mod: PBBFAC,,, | Performed by: REGISTERED NURSE

## 2023-05-09 PROCEDURE — 99395 PR PREVENTIVE VISIT,EST,18-39: ICD-10-PCS | Mod: S$GLB,,, | Performed by: REGISTERED NURSE

## 2023-05-09 PROCEDURE — 87591 N.GONORRHOEAE DNA AMP PROB: CPT | Performed by: REGISTERED NURSE

## 2023-05-09 PROCEDURE — 88142 CYTOPATH C/V THIN LAYER: CPT | Performed by: REGISTERED NURSE

## 2023-05-09 PROCEDURE — 99999 PR PBB SHADOW E&M-EST. PATIENT-LVL III: CPT | Mod: PBBFAC,,, | Performed by: REGISTERED NURSE

## 2023-05-09 PROCEDURE — 99395 PREV VISIT EST AGE 18-39: CPT | Mod: S$GLB,,, | Performed by: REGISTERED NURSE

## 2023-05-09 PROCEDURE — 81514 NFCT DS BV&VAGINITIS DNA ALG: CPT | Performed by: REGISTERED NURSE

## 2023-05-09 NOTE — PROGRESS NOTES
CC: Annual    HPI: Pt is a 22 y.o.  female who presents for routine annual exam. She uses condoms inconsistently for contraception. She does not desire pregnancy. She is not interested in any type of hormonal birth control. She does want STD screening (swabs).  Reports vaginal odor.  Patient's last menstrual period was 04/03/2023. The patient participates in regular exercise: no.  The patient vapes with nicotine daily.  The patient wears seatbelts.   Pt denies any domestic violence. She works at the [employer] in [place].     FH:  Breast cancer: none  Colon cancer: none  Ovarian cancer: none  Endometrial cancer: none    ROS:  GENERAL: Feeling well overall. Denies fever or chills.   SKIN: Denies rash or lesions.   HEAD: Denies head injury or headache.   NODES: Denies enlarged lymph nodes.   CHEST: Denies chest pain or shortness of breath.   CARDIOVASCULAR: Denies palpitations or left sided chest pain.   ABDOMEN: No abdominal pain, constipation, diarrhea, nausea, vomiting or rectal bleeding.   URINARY: No dysuria, hematuria, or burning on urination.  REPRODUCTIVE: See HPI.   BREASTS: Denies pain, lumps, or nipple discharge.   HEMATOLOGIC: No easy bruisability or excessive bleeding.   MUSCULOSKELETAL: Denies joint pain or swelling.   NEUROLOGIC: Denies syncope or weakness.   PSYCHIATRIC: Denies depression, anxiety or mood swings.    PE:   APPEARANCE: Well nourished, well developed, White female in no acute distress.  NODES: no cervical, supraclavicular, or inguinal lymphadenopathy  BREASTS: Symmetrical, no skin changes or visible lesions. No palpable masses, nipple discharge or adenopathy bilaterally.  ABDOMEN: Soft. No tenderness or masses. No distention. No hernias palpated. No CVA tenderness.  VULVA: No lesions. Normal external female genitalia.  URETHRAL MEATUS: Normal size and location, no lesions, no prolapse.  URETHRA: No masses, tenderness, or prolapse.  VAGINA: Moist. No lesions or lacerations noted. + pale  yellow/dark brown discharge. + odor present.   CERVIX: No lesions or discharge. No cervical motion tenderness.   UTERUS: Normal size, regular shape, mobile, non-tender.  ADNEXA: No tenderness. No fullness or masses palpated in the adnexal regions.   ANUS PERINEUM: Normal.      Diagnosis:  1. Women's annual routine gynecological examination    2. Encounter for Papanicolaou smear for cervical cancer screening    3. Screen for STD (sexually transmitted disease)    4. Vaginal odor        Plan:   Pap  Affirm- will treat based on results  GC  The risks of, benefits of, and alternatives of various forms of contraception were discussed at this visit. After a discussion of the R/B/A of fertility awareness, barrier contraception, , Phexxi, hormonal pills, injections, patches, hormonal and non-hormonal IUDs, and the subdermal implant, all of  questions were answered, and she has opted for to defer birth control at this time. Discussed the importance of using condoms with every sexual encounter. Discussed starting prenatal vitamin if not consistently using condoms.    Patient was counseled today on the new ACS guidelines for cervical cytology screening as well as the current recommendations for breast cancer screening. She was counseled to follow up with her PCP for other routine health maintenance. Counseling session lasted approximately 10 minutes, and all her questions were answered.    Follow-up with me in 1 year for routine exam.        CRYS Talbert        Answers submitted by the patient for this visit:  Gynecologic Exam Questionnaire  (Submitted on 5/9/2023)  Chief Complaint: Gynecologic exam  genital itching: No  genital lesions: No  genital odor: Yes  genital rash: No  missed menses: No  pelvic pain: No  vaginal bleeding: No  vaginal discharge: No  Chronicity: recurrent  Onset: more than 1 month ago  Frequency: constantly  Progression since onset: unchanged  Pain severity: no pain  Affected side:  both  Pregnant now?: No  abdominal pain: No  anorexia: No  back pain: No  chills: No  constipation: No  diarrhea: No  discolored urine: No  dysuria: No  fever: No  flank pain: No  frequency: No  headaches: No  hematuria: No  nausea: No  painful intercourse: No  rash: No  urgency: No  vomiting: No  Vaginal bleeding: typical of menses  Passing clots?: No  Passing tissue?: No  Aggravated by: activity, intercourse  Improvement on treatment: mild  Sexual activity: sexually active  Partner with STD symptoms: unknown  Menstrual history: irregular  STD: No  abdominal surgery: No   section: No  Ectopic pregnancy: No  Endometriosis: No  herpes simplex: No  gynecological surgery: No  menorrhagia: No  metrorrhagia: No  miscarriage: No  ovarian cysts: No  perineal abscess: No  PID: No  terminated pregnancy: No  vaginosis: No

## 2023-05-10 LAB
C TRACH DNA SPEC QL NAA+PROBE: NOT DETECTED
N GONORRHOEA DNA SPEC QL NAA+PROBE: NOT DETECTED

## 2023-05-11 LAB
BACTERIAL VAGINOSIS DNA: POSITIVE
CANDIDA GLABRATA DNA: NEGATIVE
CANDIDA KRUSEI DNA: NEGATIVE
CANDIDA RRNA VAG QL PROBE: POSITIVE
T VAGINALIS RRNA GENITAL QL PROBE: NEGATIVE

## 2023-05-13 ENCOUNTER — PATIENT MESSAGE (OUTPATIENT)
Dept: OBSTETRICS AND GYNECOLOGY | Facility: CLINIC | Age: 23
End: 2023-05-13
Payer: COMMERCIAL

## 2023-05-15 DIAGNOSIS — N76.0 BACTERIAL VAGINOSIS: Primary | ICD-10-CM

## 2023-05-15 DIAGNOSIS — B96.89 BACTERIAL VAGINOSIS: Primary | ICD-10-CM

## 2023-05-15 DIAGNOSIS — B37.31 VAGINAL YEAST INFECTION: ICD-10-CM

## 2023-05-15 RX ORDER — TERCONAZOLE 4 MG/G
1 CREAM VAGINAL NIGHTLY
Qty: 45 G | Refills: 0 | Status: SHIPPED | OUTPATIENT
Start: 2023-05-15

## 2023-05-15 RX ORDER — METRONIDAZOLE 500 MG/1
500 TABLET ORAL 2 TIMES DAILY
Qty: 14 TABLET | Refills: 0 | Status: SHIPPED | OUTPATIENT
Start: 2023-05-15 | End: 2023-05-22

## 2023-05-16 LAB
FINAL PATHOLOGIC DIAGNOSIS: NORMAL
Lab: NORMAL

## 2023-10-20 ENCOUNTER — PATIENT MESSAGE (OUTPATIENT)
Dept: PRIMARY CARE CLINIC | Facility: CLINIC | Age: 23
End: 2023-10-20
Payer: COMMERCIAL

## 2023-10-24 RX ORDER — ESCITALOPRAM OXALATE 20 MG/1
20 TABLET ORAL DAILY
Qty: 90 TABLET | Refills: 2 | Status: SHIPPED | OUTPATIENT
Start: 2023-10-24 | End: 2024-10-23

## 2023-10-24 NOTE — TELEPHONE ENCOUNTER
No care due was identified.  [facility] Embedded Care Due Messages. Reference number: [id number].   10/24/2023 8:27:18 AM CDT

## 2023-12-05 ENCOUNTER — PATIENT MESSAGE (OUTPATIENT)
Dept: ORTHOPEDICS | Facility: CLINIC | Age: 23
End: 2023-12-05
Payer: COMMERCIAL

## 2023-12-11 ENCOUNTER — TELEPHONE (OUTPATIENT)
Dept: ORTHOPEDICS | Facility: CLINIC | Age: 23
End: 2023-12-11
Payer: COMMERCIAL

## 2023-12-11 NOTE — TELEPHONE ENCOUNTER
Called to reschedule appointment on 12/28/23 with Dr Asif due to him being out of the clinic. Can respond to my chart message or call back angeline. Provided with call back number [phone]

## 2024-11-06 ENCOUNTER — OFFICE VISIT (OUTPATIENT)
Dept: PRIMARY CARE CLINIC | Facility: CLINIC | Age: 24
End: 2024-11-06
Payer: COMMERCIAL

## 2024-11-06 ENCOUNTER — LAB VISIT (OUTPATIENT)
Dept: LAB | Facility: HOSPITAL | Age: 24
End: 2024-11-06
Attending: FAMILY MEDICINE
Payer: COMMERCIAL

## 2024-11-06 VITALS
BODY MASS INDEX: 24.24 KG/M2 | HEIGHT: 64 IN | OXYGEN SATURATION: 98 % | WEIGHT: 142 LBS | RESPIRATION RATE: 18 BRPM | DIASTOLIC BLOOD PRESSURE: 64 MMHG | SYSTOLIC BLOOD PRESSURE: 102 MMHG | TEMPERATURE: 98 F | HEART RATE: 68 BPM

## 2024-11-06 DIAGNOSIS — Z00.00 WELL ADULT EXAM: ICD-10-CM

## 2024-11-06 DIAGNOSIS — Z00.00 WELL ADULT EXAM: Primary | ICD-10-CM

## 2024-11-06 DIAGNOSIS — F41.1 GAD (GENERALIZED ANXIETY DISORDER): ICD-10-CM

## 2024-11-06 LAB
ALBUMIN SERPL BCP-MCNC: 4.2 G/DL (ref 3.5–5.2)
ALP SERPL-CCNC: 60 U/L (ref 40–150)
ALT SERPL W/O P-5'-P-CCNC: 13 U/L (ref 10–44)
ANION GAP SERPL CALC-SCNC: 10 MMOL/L (ref 8–16)
AST SERPL-CCNC: 19 U/L (ref 10–40)
BILIRUB SERPL-MCNC: 0.6 MG/DL (ref 0.1–1)
BUN SERPL-MCNC: 11 MG/DL (ref 6–20)
CALCIUM SERPL-MCNC: 9.6 MG/DL (ref 8.7–10.5)
CHLORIDE SERPL-SCNC: 106 MMOL/L (ref 95–110)
CHOLEST SERPL-MCNC: 167 MG/DL (ref 120–199)
CHOLEST/HDLC SERPL: 2.7 {RATIO} (ref 2–5)
CO2 SERPL-SCNC: 22 MMOL/L (ref 23–29)
CREAT SERPL-MCNC: 0.8 MG/DL (ref 0.5–1.4)
ERYTHROCYTE [DISTWIDTH] IN BLOOD BY AUTOMATED COUNT: 13.1 % (ref 11.5–14.5)
EST. GFR  (NO RACE VARIABLE): >60 ML/MIN/1.73 M^2
ESTIMATED AVG GLUCOSE: 91 MG/DL (ref 68–131)
GLUCOSE SERPL-MCNC: 78 MG/DL (ref 70–110)
HBA1C MFR BLD: 4.8 % (ref 4–5.6)
HCT VFR BLD AUTO: 42.9 % (ref 37–48.5)
HDLC SERPL-MCNC: 63 MG/DL (ref 40–75)
HDLC SERPL: 37.7 % (ref 20–50)
HGB BLD-MCNC: 14 G/DL (ref 12–16)
LDLC SERPL CALC-MCNC: 93.8 MG/DL (ref 63–159)
MCH RBC QN AUTO: 30.8 PG (ref 27–31)
MCHC RBC AUTO-ENTMCNC: 32.6 G/DL (ref 32–36)
MCV RBC AUTO: 95 FL (ref 82–98)
NONHDLC SERPL-MCNC: 104 MG/DL
PLATELET # BLD AUTO: 319 K/UL (ref 150–450)
PMV BLD AUTO: 9.8 FL (ref 9.2–12.9)
POTASSIUM SERPL-SCNC: 3.9 MMOL/L (ref 3.5–5.1)
PROT SERPL-MCNC: 7.6 G/DL (ref 6–8.4)
RBC # BLD AUTO: 4.54 M/UL (ref 4–5.4)
SODIUM SERPL-SCNC: 138 MMOL/L (ref 136–145)
TRIGL SERPL-MCNC: 51 MG/DL (ref 30–150)
TSH SERPL DL<=0.005 MIU/L-ACNC: 2.17 UIU/ML (ref 0.4–4)
WBC # BLD AUTO: 6.54 K/UL (ref 3.9–12.7)

## 2024-11-06 PROCEDURE — 36415 COLL VENOUS BLD VENIPUNCTURE: CPT | Mod: PN | Performed by: FAMILY MEDICINE

## 2024-11-06 PROCEDURE — 3078F DIAST BP <80 MM HG: CPT | Mod: CPTII,S$GLB,, | Performed by: FAMILY MEDICINE

## 2024-11-06 PROCEDURE — 3008F BODY MASS INDEX DOCD: CPT | Mod: CPTII,S$GLB,, | Performed by: FAMILY MEDICINE

## 2024-11-06 PROCEDURE — 84443 ASSAY THYROID STIM HORMONE: CPT | Performed by: FAMILY MEDICINE

## 2024-11-06 PROCEDURE — 80061 LIPID PANEL: CPT | Performed by: FAMILY MEDICINE

## 2024-11-06 PROCEDURE — 99999 PR PBB SHADOW E&M-EST. PATIENT-LVL IV: CPT | Mod: PBBFAC,,, | Performed by: FAMILY MEDICINE

## 2024-11-06 PROCEDURE — 85027 COMPLETE CBC AUTOMATED: CPT | Performed by: FAMILY MEDICINE

## 2024-11-06 PROCEDURE — 80053 COMPREHEN METABOLIC PANEL: CPT | Performed by: FAMILY MEDICINE

## 2024-11-06 PROCEDURE — 3044F HG A1C LEVEL LT 7.0%: CPT | Mod: CPTII,S$GLB,, | Performed by: FAMILY MEDICINE

## 2024-11-06 PROCEDURE — 3074F SYST BP LT 130 MM HG: CPT | Mod: CPTII,S$GLB,, | Performed by: FAMILY MEDICINE

## 2024-11-06 PROCEDURE — 83036 HEMOGLOBIN GLYCOSYLATED A1C: CPT | Performed by: FAMILY MEDICINE

## 2024-11-06 PROCEDURE — 99395 PREV VISIT EST AGE 18-39: CPT | Mod: S$GLB,,, | Performed by: FAMILY MEDICINE

## 2024-11-06 PROCEDURE — 1159F MED LIST DOCD IN RCRD: CPT | Mod: CPTII,S$GLB,, | Performed by: FAMILY MEDICINE

## 2024-11-06 RX ORDER — ALPRAZOLAM 0.25 MG/1
0.25 TABLET ORAL 2 TIMES DAILY PRN
Qty: 60 TABLET | Refills: 0 | Status: SHIPPED | OUTPATIENT
Start: 2024-11-06

## 2024-11-06 RX ORDER — VENLAFAXINE HYDROCHLORIDE 75 MG/1
75 CAPSULE, EXTENDED RELEASE ORAL DAILY
Qty: 30 CAPSULE | Refills: 5 | Status: SHIPPED | OUTPATIENT
Start: 2024-11-06 | End: 2025-11-06

## 2024-11-06 RX ORDER — VENLAFAXINE HYDROCHLORIDE 37.5 MG/1
37.5 CAPSULE, EXTENDED RELEASE ORAL DAILY
Qty: 7 CAPSULE | Refills: 0 | Status: SHIPPED | OUTPATIENT
Start: 2024-11-06 | End: 2025-11-06

## 2024-11-08 NOTE — PROGRESS NOTES
"      /64 (BP Location: Left arm, Patient Position: Sitting)   Pulse 68   Temp 98.3 °F (36.8 °C) (Oral)   Resp 18   Ht 5' 4" (1.626 m)   Wt 64.4 kg (141 lb 15.6 oz)   LMP 10/01/2024 (Approximate)   SpO2 98%   BMI 24.37 kg/m²       ===========              Columba Allison is a 23 y.o. female           History of Present Illness    CHIEF COMPLAINT:  Columba presents for medication management of generalized anxiety and to discuss increasing anxiety symptoms despite current treatment with Lexapro.    HPI:  Columba reports inadequate control of anxiety while on the maximum daily dose of Lexapro (20 milligrams). She expresses a need for additional anxiety management, describing her anxiety as generalized, not necessarily tied to increased stressors, but rather excessive worrying about inconsequential matters. She notes a perceived decrease in medication efficacy. Columba also reports alternating constipation and diarrhea, a new issue for her, and denies hematochezia. She acknowledges difficulty quitting smoking, particularly due to her work in the [employer] industry where smoking is prevalent.    MEDICATIONS:  Columba is on Lexapro 20 mg daily for anxiety, which is not fully effective. She is also on Alprazolam (Xanax) 0.25 mg as needed for overwhelming anxiety, used sparingly. Columba reports that 60 tablets of Alprazolam lasted over 1.5 years.    MEDICAL HISTORY:  Columba has a history of generalized anxiety disorder. Columba reports not following up with a gynecologist for an extended period for her last Pap or pelvic exam.    SOCIAL HISTORY:  Columba is a current smoker. She works part-time at a [employer] store, having stepped down from a  position. 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 Columba is applying for school to study accounting.               Patient queried and denies any further complaints      Patient Active Problem List   Diagnosis    Ganglion cyst    " Idiopathic scoliosis    KRISTY (generalized anxiety disorder)       SURGICAL AND MEDICAL HISTORY: updated and reviewed.  Past Surgical History:   Procedure Laterality Date    FUSION OF SPINE WITH INSTRUMENTATION N/A 8/15/2019    Procedure: FUSION, SPINE, WITH INSTRUMENTATION-OP;  Surgeon: Alexander Asif MD;  Location: [facility] OR [address] [place] [address];  Service: Orthopedics;  Laterality: N/A;     ALLERGIES updated and reviewed.  Review of patient's allergies indicates:  No Known Allergies    CURRENT OUTPATIENT MEDICATIONS updated and reviewed    Current Outpatient Medications:     terconazole (TERAZOL 7) 0.4 % Crea, Place 1 applicator vaginally every evening., Disp: 45 g, Rfl: 0    ALPRAZolam (XANAX) 0.25 MG tablet, Take 1 tablet (0.25 mg total) by mouth 2 (two) times daily as needed for Anxiety., Disp: 60 tablet, Rfl: 0    venlafaxine (EFFEXOR XR) 75 MG 24 hr capsule, Take 1 capsule (75 mg total) by mouth once daily. After completing 37.5 mg dose, Disp: 30 capsule, Rfl: 5    venlafaxine (EFFEXOR-XR) 37.5 MG 24 hr capsule, Take 1 capsule (37.5 mg total) by mouth once daily. X 7 days then start 75mg dose, Disp: 7 capsule, Rfl: 0    Review of Systems   Constitutional:  Positive for activity change. Negative for appetite change, chills, diaphoresis, fatigue, fever and unexpected weight change.   HENT:  Negative for congestion, ear discharge, ear pain, facial swelling, hearing loss, nosebleeds, postnasal drip, rhinorrhea, sinus pressure, sneezing, sore throat, tinnitus, trouble swallowing and voice change.    Eyes:  Negative for photophobia, pain, discharge, redness, itching and visual disturbance.   Respiratory:  Negative for cough, chest tightness, shortness of breath and wheezing.    Cardiovascular:  Negative for chest pain, palpitations and leg swelling.   Gastrointestinal:  Positive for constipation. Negative for abdominal distention, abdominal pain, anal bleeding, blood in stool, diarrhea, nausea, rectal pain and vomiting.  "  Endocrine: Negative for cold intolerance, heat intolerance, polydipsia, polyphagia and polyuria.   Genitourinary:  Negative for difficulty urinating, dysuria, flank pain, hematuria and menstrual problem.   Musculoskeletal:  Negative for arthralgias, back pain, joint swelling, myalgias and neck pain.   Skin:  Negative for rash.   Neurological:  Negative for dizziness, tremors, seizures, syncope, speech difficulty, weakness, light-headedness, numbness and headaches.   Psychiatric/Behavioral:  Positive for dysphoric mood. Negative for behavioral problems, confusion, decreased concentration, sleep disturbance and suicidal ideas. The patient is not nervous/anxious and is not hyperactive.        /64 (BP Location: Left arm, Patient Position: Sitting)   Pulse 68   Temp 98.3 °F (36.8 °C) (Oral)   Resp 18   Ht 5' 4" (1.626 m)   Wt 64.4 kg (141 lb 15.6 oz)   LMP 10/01/2024 (Approximate)   SpO2 98%   BMI 24.37 kg/m²   Physical Exam  Vitals and nursing note reviewed.   Constitutional:       General: She is not in acute distress.     Appearance: Normal appearance. She is well-developed. She is not ill-appearing or toxic-appearing.   HENT:      Head: Normocephalic and atraumatic.      Right Ear: Tympanic membrane, ear canal and external ear normal.      Left Ear: Tympanic membrane, ear canal and external ear normal.      Nose: Nose normal.      Mouth/Throat:      Lips: Pink.      Mouth: Mucous membranes are moist.      Pharynx: No oropharyngeal exudate or posterior oropharyngeal erythema.   Eyes:      General: No scleral icterus.        Right eye: No discharge.         Left eye: No discharge.      Extraocular Movements: Extraocular movements intact.      Conjunctiva/sclera: Conjunctivae normal.   Cardiovascular:      Rate and Rhythm: Normal rate and regular rhythm.      Pulses: Normal pulses.      Heart sounds: Normal heart sounds. No murmur heard.  Pulmonary:      Effort: Pulmonary effort is normal. No respiratory " distress.      Breath sounds: Normal breath sounds. No wheezing or rales.   Abdominal:      General: Bowel sounds are normal. There is no distension.      Palpations: Abdomen is soft. There is no mass.      Tenderness: There is no abdominal tenderness. There is no right CVA tenderness, left CVA tenderness, guarding or rebound.      Hernia: No hernia is present.   Musculoskeletal:      Cervical back: Normal range of motion and neck supple. No rigidity or tenderness.   Lymphadenopathy:      Cervical: No cervical adenopathy.   Skin:     General: Skin is warm and dry.   Neurological:      General: No focal deficit present.      Mental Status: She is alert. Mental status is at baseline.   Psychiatric:         Mood and Affect: Mood normal.         Behavior: Behavior normal. Behavior is cooperative.           ASSESSMENT/PLAN    Assessment & Plan    Will trial Effexor (venlafaxine) for anxiety and depression management due to inadequate control on current Lexapro 20mg daily dose  Recommend lifestyle modifications for bowel issues before considering GI referral    ANXIETY:  - Assessed that the patient's anxiety is not well-controlled on the current maximum dose of Lexapro (20mg).  - Discussed different medication options with the patient, including Effexor, Celexa, and Prozac.  - Explained differences between SSRI and SNRI medications.  - Discussed potential side effects of Effexor, including possible sleep disturbances during the first week.  - Prescribed venlafaxine XR 37.5mg daily for 7 days, then increase to 75mg daily.  - Refilled alprazolam 0.25mg as needed for acute anxiety (60 tablets).  - Considered referral to a psychiatrist if needed.  - Scheduled follow up in 4-6 weeks to assess response to new medication regimen.    CONSTIPATION:  - Evaluated that the patient's constipation may be due to poor diet and inadequate fiber intake.  - Explained the likely cause of the constipation and diarrhea cycle.  - Recommend  increasing hydration and fiber intake, either through diet or supplements like Metamucil.  - Discussed the possibility of referral to a gastroenterologist if the problem persists.  - Instructed the patient to message if the problem persists after implementing dietary changes, for potential referral to a gastroenterologist.  - Educated on importance of hydration and fiber intake for bowel health.  - Columba to increase fluid intake and dietary fiber to address constipation.    SMOKING CESSATION:  - Confirmed the patient's current smoking habit.  - Acknowledged the difficulty of quitting smoking, especially given the patient's work environment.  - Discussed various smoking cessation options, including prescription medication and nicotine replacement therapy.  - Provided information on smoking cessation methods, including prescription medication (varenicline) and nicotine replacement options (patch, gum).  - Encouraged the patient to consider quitting smoking.    LABS:  - Ordered CBC, CMP, lipid panel, thyroid function tests, and hemoglobin A1c.    FLU VACCINATION:  - Flu vaccine administered in office.    FOLLOW UP:  - Instructed the patient to contact the office via [facility] if bowel symptoms persist despite lifestyle changes for potential gastroenterology referral.           Columba was seen today for annual exam.    Diagnoses and all orders for this visit:    Well adult exam  -     CBC Without Differential; Future  -     Comprehensive Metabolic Panel; Future  -     Lipid Panel; Future  -     TSH; Future  -     Hemoglobin A1C; Future    KRISTY (generalized anxiety disorder)  -     ALPRAZolam (XANAX) 0.25 MG tablet; Take 1 tablet (0.25 mg total) by mouth 2 (two) times daily as needed for Anxiety.  -     Cancel: CBC Without Differential; Future  -     Cancel: Comprehensive Metabolic Panel; Future  -     Cancel: Lipid Panel; Future  -     Cancel: TSH; Future  -     Cancel: Hemoglobin A1C; Future    Other orders  -      venlafaxine (EFFEXOR-XR) 37.5 MG 24 hr capsule; Take 1 capsule (37.5 mg total) by mouth once daily. X 7 days then start 75mg dose  -     venlafaxine (EFFEXOR XR) 75 MG 24 hr capsule; Take 1 capsule (75 mg total) by mouth once daily. After completing 37.5 mg dose            Most recent some lab results reviewed with patient.  Any new prescription medications gone over in detail including reason for taking the medication, most common possible side effects and possible costs, etcetera.    Chronic conditions updated. Other than changes or additions as above, cont current medications and maintain follow-up with specialists if indicated.     Neymar Chirinos MD  A dictation device was used to produce this document. Use of such devices sometimes results in grammatical errors or replacement of words that sound similarly.

## 2025-05-17 NOTE — TELEPHONE ENCOUNTER
No care due was identified.  [facility] Embedded Care Due Messages. Reference number: [id number].   5/17/2025 7:12:35 AM CDT   no...

## 2025-05-18 RX ORDER — VENLAFAXINE HYDROCHLORIDE 75 MG/1
75 CAPSULE, EXTENDED RELEASE ORAL DAILY
Qty: 90 CAPSULE | Refills: 1 | Status: SHIPPED | OUTPATIENT
Start: 2025-05-18

## 2025-05-18 NOTE — TELEPHONE ENCOUNTER
Refill Routing Note   Medication(s) are not appropriate for processing by [facility] for the following reason(s):        Clarification of medication (Rx) details    [facility] action(s):  Defer      Medication Therapy Plan: Sig/qty adjusted to reflect patient's current dose, pending provider review      Appointments  past 12m or future 3m with PCP    Date Provider   Last Visit   11/6/2024 Neymar Chirinos MD   Next Visit   Visit date not found Neymar Chirinos MD   ED visits in past 90 days: 0        Note composed:11:57 AM 05/18/2025

## 2025-06-24 ENCOUNTER — OCCUPATIONAL HEALTH (OUTPATIENT)
Dept: URGENT CARE | Facility: CLINIC | Age: 25
End: 2025-06-24

## 2025-06-24 DIAGNOSIS — Z02.83 ENCOUNTER FOR DRUG SCREENING: Primary | ICD-10-CM

## 2025-06-24 LAB
CTP QC/QA: YES
POC 5 PANEL DRUG SCREEN: NEGATIVE

## (undated) DEVICE — SEE MEDLINE ITEM 157148

## (undated) DEVICE — BOVIE SUCTION

## (undated) DEVICE — SUCTION FRAZIER TIP SURG 12FR

## (undated) DEVICE — SOL NACL 0.45% 1000ML BG

## (undated) DEVICE — ADHESIVE DERMABOND ADVANCED

## (undated) DEVICE — DRESSING TRANS 8X12 TEGADERM

## (undated) DEVICE — BLANKET LOWER BODY 55.9X40.2IN

## (undated) DEVICE — SEE MEDLINE ITEM 156905

## (undated) DEVICE — SUT VICRYL+ 1 CT1 18IN

## (undated) DEVICE — MARKER SKIN STND TIP BLUE BARR

## (undated) DEVICE — DRAPE STERI INSTRUMENT 1018

## (undated) DEVICE — DURAPREP SURG SCRUB 26ML

## (undated) DEVICE — DRESSING MEPILEX BORDER 4 X 4

## (undated) DEVICE — DRESSING AQUACEL SACRAL 9 X 9

## (undated) DEVICE — BUR BONE CUT MICRO TPS 3X3.8MM

## (undated) DEVICE — DRESSING AQUACEL AG 3.5X10IN

## (undated) DEVICE — NDL ECLIPSE SAFETY 18GX1-1/2IN

## (undated) DEVICE — SEE MEDLINE ITEM 157150

## (undated) DEVICE — KIT IRR SUCTION HND PIECE

## (undated) DEVICE — KIT SPINAL PATIENT CARE JACK

## (undated) DEVICE — DRAPE C ARM 42 X 120 10/BX

## (undated) DEVICE — SOL IRR NACL .9% 3000ML

## (undated) DEVICE — SET DECANTER MEDICHOICE

## (undated) DEVICE — TRAY FOLEY 16FR INFECTION CONT

## (undated) DEVICE — KIT EVACUATOR 3-SPRING 1/8 DRN

## (undated) DEVICE — ELECTRODE REM PLYHSV RETURN 9

## (undated) DEVICE — DRAPE C-ARMOR EQUIPMENT COVER

## (undated) DEVICE — DIFFUSER

## (undated) DEVICE — DRESSING AQUACEL FOAM 5 X 5

## (undated) DEVICE — DRAPE STERI-DRAPE 1000 17X11IN